# Patient Record
Sex: FEMALE | Race: WHITE | Employment: FULL TIME | URBAN - METROPOLITAN AREA
[De-identification: names, ages, dates, MRNs, and addresses within clinical notes are randomized per-mention and may not be internally consistent; named-entity substitution may affect disease eponyms.]

---

## 2017-12-15 PROBLEM — R01.1 MURMUR: Status: ACTIVE | Noted: 2017-12-15

## 2017-12-15 PROBLEM — Q23.1 BICUSPID AORTIC VALVE: Status: ACTIVE | Noted: 2017-12-15

## 2017-12-15 PROBLEM — Q23.1 BICUSPID AORTIC VALVE (HCC): Status: ACTIVE | Noted: 2017-12-15

## 2017-12-15 PROBLEM — Q23.81 BICUSPID AORTIC VALVE: Status: ACTIVE | Noted: 2017-12-15

## 2023-09-28 ENCOUNTER — TELEPHONE (OUTPATIENT)
Dept: FAMILY MEDICINE CLINIC | Facility: CLINIC | Age: 33
End: 2023-09-28

## 2023-09-28 ENCOUNTER — OFFICE VISIT (OUTPATIENT)
Dept: FAMILY MEDICINE CLINIC | Facility: CLINIC | Age: 33
End: 2023-09-28
Payer: COMMERCIAL

## 2023-09-28 ENCOUNTER — HOSPITAL ENCOUNTER (OUTPATIENT)
Dept: GENERAL RADIOLOGY | Age: 33
Discharge: HOME | End: 2023-09-28
Attending: FAMILY MEDICINE
Payer: COMMERCIAL

## 2023-09-28 VITALS
BODY MASS INDEX: 29.29 KG/M2 | DIASTOLIC BLOOD PRESSURE: 70 MMHG | SYSTOLIC BLOOD PRESSURE: 122 MMHG | WEIGHT: 175.81 LBS | TEMPERATURE: 97 F | RESPIRATION RATE: 26 BRPM | HEIGHT: 65 IN | HEART RATE: 101 BPM | OXYGEN SATURATION: 100 %

## 2023-09-28 DIAGNOSIS — Z23 NEED FOR VACCINATION: ICD-10-CM

## 2023-09-28 DIAGNOSIS — R20.0 NUMBNESS AND TINGLING: Primary | ICD-10-CM

## 2023-09-28 DIAGNOSIS — R07.89 ATYPICAL CHEST PAIN: ICD-10-CM

## 2023-09-28 DIAGNOSIS — R20.2 NUMBNESS AND TINGLING: Primary | ICD-10-CM

## 2023-09-28 LAB
ALBUMIN SERPL-MCNC: 3.8 G/DL (ref 3.4–5)
ALBUMIN/GLOB SERPL: 1 {RATIO} (ref 1–2)
ALP LIVER SERPL-CCNC: 57 U/L
ALT SERPL-CCNC: 28 U/L
ANION GAP SERPL CALC-SCNC: 5 MMOL/L (ref 0–18)
AST SERPL-CCNC: 15 U/L (ref 15–37)
BASOPHILS # BLD AUTO: 0.06 X10(3) UL (ref 0–0.2)
BASOPHILS NFR BLD AUTO: 0.9 %
BILIRUB SERPL-MCNC: 0.3 MG/DL (ref 0.1–2)
BUN BLD-MCNC: 13 MG/DL (ref 7–18)
CALCIUM BLD-MCNC: 9.2 MG/DL (ref 8.5–10.1)
CHLORIDE SERPL-SCNC: 111 MMOL/L (ref 98–112)
CO2 SERPL-SCNC: 25 MMOL/L (ref 21–32)
CREAT BLD-MCNC: 0.9 MG/DL
EGFRCR SERPLBLD CKD-EPI 2021: 85 ML/MIN/1.73M2 (ref 60–?)
EOSINOPHIL # BLD AUTO: 0.07 X10(3) UL (ref 0–0.7)
EOSINOPHIL NFR BLD AUTO: 1 %
ERYTHROCYTE [DISTWIDTH] IN BLOOD BY AUTOMATED COUNT: 12.5 %
ERYTHROCYTE [SEDIMENTATION RATE] IN BLOOD: 21 MM/HR
EST. AVERAGE GLUCOSE BLD GHB EST-MCNC: 117 MG/DL (ref 68–126)
FASTING STATUS PATIENT QL REPORTED: YES
GLOBULIN PLAS-MCNC: 3.8 G/DL (ref 2.8–4.4)
GLUCOSE BLD-MCNC: 105 MG/DL (ref 70–99)
HBA1C MFR BLD: 5.7 % (ref ?–5.7)
HCT VFR BLD AUTO: 42.7 %
HGB BLD-MCNC: 14.3 G/DL
IMM GRANULOCYTES # BLD AUTO: 0.01 X10(3) UL (ref 0–1)
IMM GRANULOCYTES NFR BLD: 0.1 %
LIPASE SERPL-CCNC: 90 U/L (ref 13–75)
LYMPHOCYTES # BLD AUTO: 1.69 X10(3) UL (ref 1–4)
LYMPHOCYTES NFR BLD AUTO: 24.4 %
MCH RBC QN AUTO: 29 PG (ref 26–34)
MCHC RBC AUTO-ENTMCNC: 33.5 G/DL (ref 31–37)
MCV RBC AUTO: 86.6 FL
MONOCYTES # BLD AUTO: 0.49 X10(3) UL (ref 0.1–1)
MONOCYTES NFR BLD AUTO: 7.1 %
NEUTROPHILS # BLD AUTO: 4.6 X10 (3) UL (ref 1.5–7.7)
NEUTROPHILS # BLD AUTO: 4.6 X10(3) UL (ref 1.5–7.7)
NEUTROPHILS NFR BLD AUTO: 66.5 %
OSMOLALITY SERPL CALC.SUM OF ELEC: 292 MOSM/KG (ref 275–295)
PLATELET # BLD AUTO: 260 10(3)UL (ref 150–450)
POTASSIUM SERPL-SCNC: 4.2 MMOL/L (ref 3.5–5.1)
PROT SERPL-MCNC: 7.6 G/DL (ref 6.4–8.2)
RBC # BLD AUTO: 4.93 X10(6)UL
SODIUM SERPL-SCNC: 141 MMOL/L (ref 136–145)
TSI SER-ACNC: 0.95 MIU/ML (ref 0.36–3.74)
VIT B12 SERPL-MCNC: 320 PG/ML (ref 193–986)
WBC # BLD AUTO: 6.9 X10(3) UL (ref 4–11)

## 2023-09-28 PROCEDURE — 90471 IMMUNIZATION ADMIN: CPT | Performed by: FAMILY MEDICINE

## 2023-09-28 PROCEDURE — 83690 ASSAY OF LIPASE: CPT | Performed by: FAMILY MEDICINE

## 2023-09-28 PROCEDURE — 99204 OFFICE O/P NEW MOD 45 MIN: CPT | Performed by: FAMILY MEDICINE

## 2023-09-28 PROCEDURE — 80050 GENERAL HEALTH PANEL: CPT | Performed by: FAMILY MEDICINE

## 2023-09-28 PROCEDURE — 83036 HEMOGLOBIN GLYCOSYLATED A1C: CPT | Performed by: FAMILY MEDICINE

## 2023-09-28 PROCEDURE — 85652 RBC SED RATE AUTOMATED: CPT | Performed by: FAMILY MEDICINE

## 2023-09-28 PROCEDURE — 3074F SYST BP LT 130 MM HG: CPT | Performed by: FAMILY MEDICINE

## 2023-09-28 PROCEDURE — 3008F BODY MASS INDEX DOCD: CPT | Performed by: FAMILY MEDICINE

## 2023-09-28 PROCEDURE — 3078F DIAST BP <80 MM HG: CPT | Performed by: FAMILY MEDICINE

## 2023-09-28 PROCEDURE — 82607 VITAMIN B-12: CPT | Performed by: FAMILY MEDICINE

## 2023-09-28 PROCEDURE — 71046 X-RAY EXAM CHEST 2 VIEWS: CPT | Performed by: FAMILY MEDICINE

## 2023-09-28 PROCEDURE — 36415 COLL VENOUS BLD VENIPUNCTURE: CPT | Performed by: FAMILY MEDICINE

## 2023-09-28 PROCEDURE — 90715 TDAP VACCINE 7 YRS/> IM: CPT | Performed by: FAMILY MEDICINE

## 2023-09-28 RX ORDER — MULTIVITAMIN
TABLET ORAL
COMMUNITY

## 2023-09-28 RX ORDER — OMEPRAZOLE 40 MG/1
CAPSULE, DELAYED RELEASE ORAL
Qty: 30 CAPSULE | Refills: 0 | Status: SHIPPED | OUTPATIENT
Start: 2023-09-28 | End: 2023-09-28

## 2023-09-28 RX ORDER — OMEPRAZOLE 40 MG/1
CAPSULE, DELAYED RELEASE ORAL
Qty: 30 CAPSULE | Refills: 0 | Status: SHIPPED | OUTPATIENT
Start: 2023-09-28

## 2023-09-28 RX ORDER — ACETAMINOPHEN 160 MG
TABLET,DISINTEGRATING ORAL
COMMUNITY

## 2023-09-28 RX ORDER — OMEPRAZOLE 40 MG/1
CAPSULE, DELAYED RELEASE ORAL
Qty: 90 CAPSULE | Refills: 0 | OUTPATIENT
Start: 2023-09-28

## 2023-09-28 RX ORDER — DEXAMETHASONE SODIUM PHOSPHATE 4 MG/ML
1 INJECTION, SOLUTION INTRAMUSCULAR; INTRAVENOUS DAILY
COMMUNITY

## 2023-09-28 NOTE — TELEPHONE ENCOUNTER
Patient signed HIPAA medical records authorization form for the Facility identified below to disclose patient health information to [facility] / Provider Name: [facility]   Facility Address: [address] [place] [address] Phone: Orville Dean Fax:  [phone]    Specific PHI to be disclosed: pap exam, vaccines,labs     Medical records timeframe: all and any     Medical Records Request/Authorization form has been faxed to above Facility/Provider. Received fax confirmation. MR Authorization form has also been sent to scanning.

## 2023-09-28 NOTE — PROGRESS NOTES
Patient came in for draw of ordered fasting labs. Patient drawn out of right AC, x 1 attempt and tolerated well. Two light greens and a lavender tube drawn.

## 2023-09-30 DIAGNOSIS — R07.89 ATYPICAL CHEST PAIN: ICD-10-CM

## 2023-10-02 RX ORDER — OMEPRAZOLE 40 MG/1
CAPSULE, DELAYED RELEASE ORAL
Qty: 90 CAPSULE | Refills: 0 | OUTPATIENT
Start: 2023-10-02

## 2023-10-04 DIAGNOSIS — E53.8 LOW SERUM VITAMIN B12: Primary | ICD-10-CM

## 2023-10-04 RX ORDER — PYRIDOXINE HCL (VITAMIN B6) 50 MG
500 TABLET ORAL DAILY
Qty: 90 TABLET | Refills: 3 | COMMUNITY
Start: 2023-10-04 | End: 2023-11-03

## 2023-11-03 ENCOUNTER — OFFICE VISIT (OUTPATIENT)
Dept: NEUROLOGY | Facility: CLINIC | Age: 33
End: 2023-11-03
Payer: COMMERCIAL

## 2023-11-03 VITALS
HEIGHT: 65 IN | SYSTOLIC BLOOD PRESSURE: 116 MMHG | RESPIRATION RATE: 16 BRPM | DIASTOLIC BLOOD PRESSURE: 78 MMHG | BODY MASS INDEX: 29.16 KG/M2 | WEIGHT: 175 LBS | HEART RATE: 100 BPM

## 2023-11-03 DIAGNOSIS — M79.2 THORACIC NEURALGIA: Primary | ICD-10-CM

## 2023-11-03 DIAGNOSIS — R29.2: ICD-10-CM

## 2023-11-03 DIAGNOSIS — R29.2 BRISK DEEP TENDON REFLEXES: ICD-10-CM

## 2023-12-08 ENCOUNTER — TELEPHONE (OUTPATIENT)
Dept: CASE MANAGEMENT | Age: 33
End: 2023-12-08

## 2023-12-08 NOTE — TELEPHONE ENCOUNTER
Dr. Dobbs        Status: DENIED        Reference number [id number]     A copy of the denial letter is filed under the MEDIA tab, reference for complete details. You may reach out to Radhika at [phone] to discuss decision.     Please reach out to patient with plan of care.      Thank you  Yulia FAIRCHILD

## 2023-12-14 ENCOUNTER — OFFICE VISIT (OUTPATIENT)
Dept: FAMILY MEDICINE CLINIC | Facility: CLINIC | Age: 33
End: 2023-12-14
Payer: COMMERCIAL

## 2023-12-14 VITALS
OXYGEN SATURATION: 98 % | DIASTOLIC BLOOD PRESSURE: 70 MMHG | HEIGHT: 65.5 IN | HEART RATE: 91 BPM | WEIGHT: 178 LBS | TEMPERATURE: 97 F | BODY MASS INDEX: 29.3 KG/M2 | RESPIRATION RATE: 20 BRPM | SYSTOLIC BLOOD PRESSURE: 122 MMHG

## 2023-12-14 DIAGNOSIS — Z13.29 SCREENING FOR ENDOCRINE, NUTRITIONAL, METABOLIC AND IMMUNITY DISORDER: ICD-10-CM

## 2023-12-14 DIAGNOSIS — R20.2 NUMBNESS AND TINGLING: ICD-10-CM

## 2023-12-14 DIAGNOSIS — Q23.1 BICUSPID AORTIC VALVE: ICD-10-CM

## 2023-12-14 DIAGNOSIS — Z86.59 HISTORY OF ANXIETY: ICD-10-CM

## 2023-12-14 DIAGNOSIS — Z13.21 SCREENING FOR ENDOCRINE, NUTRITIONAL, METABOLIC AND IMMUNITY DISORDER: ICD-10-CM

## 2023-12-14 DIAGNOSIS — R74.8 ELEVATED LIPASE: ICD-10-CM

## 2023-12-14 DIAGNOSIS — Z13.6 SCREENING FOR ISCHEMIC HEART DISEASE: ICD-10-CM

## 2023-12-14 DIAGNOSIS — I35.1 NONRHEUMATIC AORTIC VALVE INSUFFICIENCY: ICD-10-CM

## 2023-12-14 DIAGNOSIS — Z00.00 ANNUAL PHYSICAL EXAM: Primary | ICD-10-CM

## 2023-12-14 DIAGNOSIS — R20.0 NUMBNESS AND TINGLING: ICD-10-CM

## 2023-12-14 DIAGNOSIS — Z13.0 SCREENING FOR ENDOCRINE, NUTRITIONAL, METABOLIC AND IMMUNITY DISORDER: ICD-10-CM

## 2023-12-14 DIAGNOSIS — Z23 NEED FOR VACCINATION: ICD-10-CM

## 2023-12-14 DIAGNOSIS — Z13.228 SCREENING FOR ENDOCRINE, NUTRITIONAL, METABOLIC AND IMMUNITY DISORDER: ICD-10-CM

## 2023-12-14 DIAGNOSIS — E53.8 B12 DEFICIENCY: ICD-10-CM

## 2023-12-14 LAB
ALBUMIN SERPL-MCNC: 4 G/DL (ref 3.4–5)
ALBUMIN/GLOB SERPL: 1 {RATIO} (ref 1–2)
ALP LIVER SERPL-CCNC: 73 U/L
ALT SERPL-CCNC: 36 U/L
ANION GAP SERPL CALC-SCNC: 7 MMOL/L (ref 0–18)
AST SERPL-CCNC: 10 U/L (ref 15–37)
BASOPHILS # BLD AUTO: 0.05 X10(3) UL (ref 0–0.2)
BASOPHILS NFR BLD AUTO: 0.7 %
BILIRUB SERPL-MCNC: 0.3 MG/DL (ref 0.1–2)
BUN BLD-MCNC: 17 MG/DL (ref 9–23)
CALCIUM BLD-MCNC: 9.5 MG/DL (ref 8.5–10.1)
CHLORIDE SERPL-SCNC: 108 MMOL/L (ref 98–112)
CHOLEST SERPL-MCNC: 207 MG/DL (ref ?–200)
CO2 SERPL-SCNC: 25 MMOL/L (ref 21–32)
CREAT BLD-MCNC: 0.94 MG/DL
EGFRCR SERPLBLD CKD-EPI 2021: 81 ML/MIN/1.73M2 (ref 60–?)
EOSINOPHIL # BLD AUTO: 0.08 X10(3) UL (ref 0–0.7)
EOSINOPHIL NFR BLD AUTO: 1.1 %
ERYTHROCYTE [DISTWIDTH] IN BLOOD BY AUTOMATED COUNT: 13 %
FASTING PATIENT LIPID ANSWER: YES
FASTING STATUS PATIENT QL REPORTED: YES
GLOBULIN PLAS-MCNC: 4 G/DL (ref 2.8–4.4)
GLUCOSE BLD-MCNC: 99 MG/DL (ref 70–99)
HCT VFR BLD AUTO: 43.5 %
HDLC SERPL-MCNC: 81 MG/DL (ref 40–59)
HGB BLD-MCNC: 14.3 G/DL
IMM GRANULOCYTES # BLD AUTO: 0.02 X10(3) UL (ref 0–1)
IMM GRANULOCYTES NFR BLD: 0.3 %
LDLC SERPL CALC-MCNC: 115 MG/DL (ref ?–100)
LIPASE SERPL-CCNC: 77 U/L (ref ?–300)
LYMPHOCYTES # BLD AUTO: 1.78 X10(3) UL (ref 1–4)
LYMPHOCYTES NFR BLD AUTO: 24.8 %
MCH RBC QN AUTO: 28.3 PG (ref 26–34)
MCHC RBC AUTO-ENTMCNC: 32.9 G/DL (ref 31–37)
MCV RBC AUTO: 86 FL
MONOCYTES # BLD AUTO: 0.57 X10(3) UL (ref 0.1–1)
MONOCYTES NFR BLD AUTO: 7.9 %
NEUTROPHILS # BLD AUTO: 4.68 X10 (3) UL (ref 1.5–7.7)
NEUTROPHILS # BLD AUTO: 4.68 X10(3) UL (ref 1.5–7.7)
NEUTROPHILS NFR BLD AUTO: 65.2 %
NONHDLC SERPL-MCNC: 126 MG/DL (ref ?–130)
OSMOLALITY SERPL CALC.SUM OF ELEC: 292 MOSM/KG (ref 275–295)
PLATELET # BLD AUTO: 331 10(3)UL (ref 150–450)
POTASSIUM SERPL-SCNC: 4.1 MMOL/L (ref 3.5–5.1)
PROT SERPL-MCNC: 8 G/DL (ref 6.4–8.2)
RBC # BLD AUTO: 5.06 X10(6)UL
SODIUM SERPL-SCNC: 140 MMOL/L (ref 136–145)
TRIGL SERPL-MCNC: 60 MG/DL (ref 30–149)
TSI SER-ACNC: 0.96 MIU/ML (ref 0.36–3.74)
VLDLC SERPL CALC-MCNC: 10 MG/DL (ref 0–30)
WBC # BLD AUTO: 7.2 X10(3) UL (ref 4–11)

## 2023-12-14 PROCEDURE — 99395 PREV VISIT EST AGE 18-39: CPT | Performed by: FAMILY MEDICINE

## 2023-12-14 PROCEDURE — 3078F DIAST BP <80 MM HG: CPT | Performed by: FAMILY MEDICINE

## 2023-12-14 PROCEDURE — 3008F BODY MASS INDEX DOCD: CPT | Performed by: FAMILY MEDICINE

## 2023-12-14 PROCEDURE — 80061 LIPID PANEL: CPT | Performed by: FAMILY MEDICINE

## 2023-12-14 PROCEDURE — 80050 GENERAL HEALTH PANEL: CPT | Performed by: FAMILY MEDICINE

## 2023-12-14 PROCEDURE — 83690 ASSAY OF LIPASE: CPT | Performed by: FAMILY MEDICINE

## 2023-12-14 PROCEDURE — 3074F SYST BP LT 130 MM HG: CPT | Performed by: FAMILY MEDICINE

## 2023-12-14 RX ORDER — CHOLECALCIFEROL (VITAMIN D3) 125 MCG
500 CAPSULE ORAL DAILY
COMMUNITY

## 2023-12-14 NOTE — PROGRESS NOTES
Patient came in for draw of ordered fasting labs. Patient drawn out of right arm  AC, x 1 attempt and tolerated well.  1 SST ( green ) 1 Lavender  tube drawn.

## 2023-12-18 ENCOUNTER — LAB ENCOUNTER (OUTPATIENT)
Dept: LAB | Age: 33
End: 2023-12-18
Attending: FAMILY MEDICINE
Payer: COMMERCIAL

## 2023-12-18 DIAGNOSIS — E53.8 B12 DEFICIENCY: ICD-10-CM

## 2023-12-18 DIAGNOSIS — R20.0 NUMBNESS AND TINGLING: ICD-10-CM

## 2023-12-18 DIAGNOSIS — R20.2 NUMBNESS AND TINGLING: ICD-10-CM

## 2023-12-18 LAB — VIT B12 SERPL-MCNC: 478 PG/ML (ref 193–986)

## 2023-12-18 PROCEDURE — 36415 COLL VENOUS BLD VENIPUNCTURE: CPT

## 2023-12-18 PROCEDURE — 82607 VITAMIN B-12: CPT

## 2024-02-12 ENCOUNTER — PATIENT MESSAGE (OUTPATIENT)
Dept: FAMILY MEDICINE CLINIC | Facility: CLINIC | Age: 34
End: 2024-02-12

## 2024-02-12 NOTE — TELEPHONE ENCOUNTER
Called and discussed via [id number]    Imaging approved for both MRI cervical / thoracic spine with and without contrast - evaluating for myelopathy; rule out demyelinating disease    Auth# [id number]  Valid 2/12 -4/11/2024

## 2024-02-14 NOTE — TELEPHONE ENCOUNTER
From: Daina Neves  To: Krystle Ramires  Sent: 2/12/2024 12:45 PM CST  Subject: RE: Upcoming Appointment    Good Afternoon -    I have a follow-up appointment on 3/8.    I was finally able to get my MRI insurance situation sorted out and now have my MRI scheduled for 3/15. Would it be better to wait until after my MRI is complete to come in, or should I still keep the 3/8 appointment?    Thanks,  Daina Neves  
Kathernie Red)

## 2024-03-15 ENCOUNTER — HOSPITAL ENCOUNTER (OUTPATIENT)
Dept: MRI IMAGING | Age: 34
Discharge: HOME | End: 2024-03-15
Attending: Other
Payer: COMMERCIAL

## 2024-03-15 DIAGNOSIS — R29.2 BRISK DEEP TENDON REFLEXES: ICD-10-CM

## 2024-03-15 DIAGNOSIS — M79.2 THORACIC NEURALGIA: ICD-10-CM

## 2024-03-15 DIAGNOSIS — R29.2: ICD-10-CM

## 2024-03-15 PROCEDURE — 72157 MRI CHEST SPINE W/O & W/DYE: CPT | Performed by: OTHER

## 2024-03-15 PROCEDURE — A9575 INJ GADOTERATE MEGLUMI 0.1ML: HCPCS | Performed by: OTHER

## 2024-03-15 PROCEDURE — 72156 MRI NECK SPINE W/O & W/DYE: CPT | Performed by: OTHER

## 2024-03-15 RX ORDER — GADOTERATE MEGLUMINE 376.9 MG/ML
20 INJECTION INTRAVENOUS
Status: COMPLETED | OUTPATIENT
Start: 2024-03-15 | End: 2024-03-15

## 2024-03-15 RX ADMIN — GADOTERATE MEGLUMINE 17 ML: 376.9 INJECTION INTRAVENOUS at 10:19:00

## 2024-03-19 RX ORDER — OMEPRAZOLE 20 MG/1
CAPSULE, DELAYED RELEASE ORAL
COMMUNITY
End: 2024-03-21

## 2024-03-21 ENCOUNTER — OFFICE VISIT (OUTPATIENT)
Dept: FAMILY MEDICINE CLINIC | Facility: CLINIC | Age: 34
End: 2024-03-21
Payer: COMMERCIAL

## 2024-03-21 VITALS
RESPIRATION RATE: 16 BRPM | TEMPERATURE: 98 F | OXYGEN SATURATION: 98 % | DIASTOLIC BLOOD PRESSURE: 72 MMHG | HEART RATE: 91 BPM | BODY MASS INDEX: 30 KG/M2 | WEIGHT: 180.63 LBS | SYSTOLIC BLOOD PRESSURE: 120 MMHG

## 2024-03-21 DIAGNOSIS — R20.2 NUMBNESS AND TINGLING: Primary | ICD-10-CM

## 2024-03-21 DIAGNOSIS — M51.24 HERNIATION OF INTERVERTEBRAL DISC BETWEEN T11 AND T12: ICD-10-CM

## 2024-03-21 DIAGNOSIS — R20.0 NUMBNESS AND TINGLING: Primary | ICD-10-CM

## 2024-03-21 PROCEDURE — 99214 OFFICE O/P EST MOD 30 MIN: CPT | Performed by: FAMILY MEDICINE

## 2024-03-21 PROCEDURE — 3078F DIAST BP <80 MM HG: CPT | Performed by: FAMILY MEDICINE

## 2024-03-21 PROCEDURE — 3074F SYST BP LT 130 MM HG: CPT | Performed by: FAMILY MEDICINE

## 2024-03-21 NOTE — PROGRESS NOTES
CHIEF COMPLAINT:   Chief Complaint   Patient presents with    Follow - Up     MRI test results      HPI:     Daina Neves is a 33 year old female presents for discuss MRI results. Hx of numnbness on thr right lateral chest wall > 18 months.  Continues with right-sided anterior lower chest wall numbness. Worse if sitting and leans forward.  Is able to workout 5 days a week and lift weights without any difficulty.  Denies any breathing issues.  Denies any chest pain.  No cough or fevers.  Recently completed MRI of the cervical and thoracic spine ordered by neurologist wants to review results.  Has had a negative chest x-ray previously.  Had an ultrasound of the abdomen was essentially normal.  Parts of the pancreas were not visualized and small area of the liver was not visualized but otherwise everything appeared normal.  Liver was homogenous.  Has no abdominal pain.  Feels well otherwise.  Denies any other numbness or tingling in the arms or legs. Denies back pain.       Partial HPI   Complains of numbness on the right lateral chest wall just below her bra strap greater than 1 year.  She states it started as a 3 to 4 cm area and feels like over the past couple months it is now spreading to the sides of her chest wall laterally.  She has no chronic back pain or back issues.  Had an episode of back pain a few months ago that resolved on its own.  She has no chest pain, shortness of breath, acid reflux, dysphagia, unexplained weight loss, fevers, night sweats, tingling in her arms or legs, urinary retention, double vision, dizziness, cough, rashes, history of shingles.  Notices occasionally of bends at 90 degrees and leans forward can have pain near that area.  Has no other chest discomfort is only associated with mechanical bending.  Completed neurology with Dr. Dobbs and insurance has denied MRI of cervical and thoracic spine since not having numbness and tingling in the arms and legs.  Patient is worried if  could have an abdominal mass in the area causing the symptoms.     Has history of bicuspid valve with cardiac murmur and has seen a cardiologist, Dr. Ron Ross, regularly.  Last evaluation he informed her that her echo was stable and she did not need to return for at least 3 years.      HISTORY:  Past Medical History:   Diagnosis Date    Allergic rhinitis 2017    Anxiety 2010    Bicuspid aortic valve (HCC)     Hyperlipidemia 2019    Nonrheumatic aortic (valve) insufficiency       History reviewed. No pertinent surgical history.   Family History   Problem Relation Age of Onset    Diabetes Mother     Obesity Mother     Other (blood clots) Mother     Asthma Mother     Cancer Paternal Grandfather         lung CA      Social History:   Social History     Socioeconomic History    Marital status: Single   Tobacco Use    Smoking status: Never    Smokeless tobacco: Never   Substance and Sexual Activity    Alcohol use: Yes     Alcohol/week: 3.0 standard drinks of alcohol     Types: 1 Glasses of wine, 2 Standard drinks or equivalent per week     Comment: socially    Drug use: No   Other Topics Concern    Caffeine Concern No    Exercise Yes    Seat Belt Yes    Special Diet No    Stress Concern No    Weight Concern No   Social History Narrative    Single     No kids    Nonsmoker    Occasional EtOH    No drugs            Medications (Active prior to today's visit):  Current Outpatient Medications   Medication Sig Dispense Refill    cyanocobalamin 500 MCG Oral Tab Take 1 tablet (500 mcg total) by mouth daily.      valACYclovir 50 mg/mL Oral Suspension Take 20 mL (1,000 mg total) by mouth every 8 (eight) hours.      Multiple Vitamin (MULTI-DAY VITAMINS) Oral Tab Take by mouth.      calcium carb-cholecalciferol 500-10 MG-MCG Oral Chew Tab Chew 1 tablet by mouth daily.      cholecalciferol 50 MCG (2000 UT) Oral Cap Take by mouth.      MedroxyPROGESTERone Acetate (DEPO-PROVERA) 150 MG/ML Intramuscular Suspension Last  given 12/12/2023         Allergies:  Allergies   Allergen Reactions    Avocado SHORTNESS OF BREATH and OTHER (SEE COMMENTS)     SOB        PSFH elements reviewed from today and agreed except as otherwise stated in HPI.  PHYSICAL EXAM:   /72 (BP Location: Left arm, Patient Position: Sitting, Cuff Size: adult)   Pulse 91   Temp 98.4 °F (36.9 °C) (Temporal)   Resp 16   Wt 180 lb 9.6 oz (81.9 kg)   LMP  (LMP Unknown)   SpO2 98%   BMI 29.60 kg/m²   Vital signs reviewed.Appears stated age, well groomed.  Physical Exam  Chest:          Comments: red dots marked the area of numbness there is slight indentation in the cartilage which is just an anatomical variant.  There is no reproducible pain.  But this is the area where she notices the numbness-the anterior cartilaginous portion on the right side of the ribs.       General: Well-nourished, well hydrated. No acute distress. No pallor. No tachypnea. Non toxic.  HEENT: normocephaly, atraumatic.  Sclera clear and non icteric bilaterally.  Oral pharynx clear without normal finding.  Moist mucous membranes.  Neck: supple. No adenopathy. No thyromegaly.   Heart: RRR without S3 or S4 murmur . Clear S1S2  Lungs: clear to auscultation bilaterally. No rales, rhonchi or wheezes. Good inspiratory and expiratory effort  Abdomen: soft, nontender, nondistended. NL bowel sounds. No HSM  Extremities: No cyanosis, clubbing, or edema bilaterally.   Skin: no acute rashes  Neuro: AOx3.  Normal gait     LABS   tudy Result    Narrative   PROCEDURE:  MRI CERVICAL+THORACIC SPINE (ALL W+WO) (CPT=72156/64058)     COMPARISON:  None.     INDICATIONS:  M79.2 Thoracic neuralgia R29.2 Avalos's reflex positive R29.2 Brisk deep tendon reflexes     TECHNIQUE:  A variety of imaging planes and parameters were utilized for visualization of suspected pathology prior to and after intravenous gadolinium injection.       PATIENT STATED HISTORY:(As transcribed by Technologist)  Patient has numbness in  the sternum with right side abdominal pain.      CONTRAST USED:  17 mL of Dotarem     FINDINGS:       Partial fusion of C2-C3.  Vertebral body heights and disc spaces are otherwise well-maintained.     Prevertebral soft tissues and predental space are unremarkable.     Alignment is overall unremarkable.           CERVICAL DISC LEVELS:  C2-C3:  No significant disc/facet abnormality, spinal stenosis, or foraminal narrowing.  C3-C4:  No significant disc/facet abnormality, spinal stenosis, or foraminal narrowing.  C4-C5:  No significant disc/facet abnormality, spinal stenosis, or foraminal narrowing.  C5-C6:  No significant disc/facet abnormality, spinal stenosis, or foraminal narrowing.  C6-C7:  No significant disc/facet abnormality, spinal stenosis, or foraminal narrowing.  C7-T1:  No significant disc/facet abnormality, spinal stenosis, or foraminal narrowing.     CRANIOCERVICAL AREA:  Normal foramen magnum with no Chiari malformation.    PARASPINAL AREA:  Normal with no visible mass.    RACHEL STRUCTURES:  No fracture, pars defect, or osseous lesion.    CORD:  Normal caliber, contour and signal intensity.       THORACIC DISC LEVELS:  CORD:  Normal caliber, contour, and signal.  The conus terminates at a normal level.  No abnormal contrast enhancement.    BONES:  Normal alignment without significant spondylosis or scoliosis.    DISCS:  Left paracentral disc bulge/herniation at T11-T12.  This minimally effacing the thoracic cord.  No significant spinal canal or neural foraminal stenosis.  OTHER:  Unremarkable paraspinous region with no visible mass.                     Impression   CONCLUSION:       1. No significant spinal canal or neural foraminal stenosis in the cervical spine.     2. A left paracentral disc herniation T11-T12 is minimally effacing the thoracic cord.  There is no significant spinal canal or neural foraminal stenosis.     3. No enhancing lesions.       No visits with results within 2 Month(s) from  this visit.   Latest known visit with results is:   [facility] Lab Encounter on 12/18/2023   Component Date Value    Vitamin B12 12/18/2023 478     xam: US ABDOMEN COMPLETE   CPT Code(s): 62856 - US EXAM ABDOM COMPLETE     EXAM: Complete abdominal ultrasound 1/8/2024.     COMPARISON:  None.     INDICATION: Numbness and tingling. Right upper quadrant numbness. Elevated lipase.     TECHNIQUE:  Grayscale and color Doppler ultrasound images of the abdomen were obtained.     FINDINGS:  The pancreas is partially obscured by overlying bowel gas. No appreciable abnormality of the visualized portion of the head to proximal body of the pancreas.     The liver demonstrates normal size and echogenicity. No focal hepatic lesions or intrahepatic biliary ductal dilatation are identified, although shadowing from adjacent ribs limits evaluation. The main portal vein demonstrates hepatopetal color flow. The    common duct measures 4 mm at the dana hepatis.     The gallbladder appears within normal limits, without shadowing echogenic gallstones, wall thickening, or pericholecystic fluid. No ascites is seen on the images provided.     The proximal abdominal aorta is obscured by overlying bowel gas and suboptimally assessed. The mid to distal abdominal aorta is patent and nonaneurysmal, measuring 1.5 and 1.3 cm in AP diameter, respectively. The intrahepatic IVC is patent.     The right kidney measures 9.9 cm in length. The left kidney measures approximately 10.5 cm in length, with the lower pole partially obscured by overlying bowel gas. No hydronephrosis. No renal mass, shadowing renal calculus, or perinephric fluid   collections are identified on the images provided. Normal color flow in the renal ani.     The spleen is normal in size, measuring 8.7 cm in length.     IMPRESSION:   1. Suboptimal evaluation of portions of the pancreas, the proximal abdominal aorta, and the lower pole of the left kidney due to overlying bowel gas. Suboptimal  evaluation of portions of the liver due to shadowing from adjacent ribs.   2. Otherwise, no appreciable abnormality of the abdomen, within the limitations of this exam.     This report was performed utilizing speech recognition software technology. Despite thorough proofreading, speech recognition errors could escape detection. If a word or phrase is confusing or out of context, please do not hesitate to call for   clarification.     Interpreting Radiologist:     Merle Alcazar M.D.   Electronically Signed: 01/08/2024 09:41 AM   REVIEWED THIS VISIT  ASSESSMENT/PLAN:   33 year old female with    1. Numbness and tingling  2. Herniation of intervertebral disc between T11 and T12  Etiology still unclear  Slight anatomical cavitation in cartilaginous portion of right rib causing some impingement on the nerve?  MRI does not demonstrate any demyelinating disease or lesion.  Discussed with patient this is very reassuring.  Has no pain with disc herniation is mild is paracentral there is no stenosis.  Is on the left side do not feel this could correlate to her symptoms.  Will defer for confirmation to neurologist she will schedule follow-up with Dr. Dobbs.  Patient's had a negative abdominal ultrasound of liver pancreas was not visualized but no other pancreatic symptoms or warning symptoms of cancer, no i.e. unexplained weight loss loss of appetite night sweats, vomiting, abdominal pain excetra.  Chest x-ray was completely clear.  If symptoms worsen or change could consider MRI of the chest to look at the more soft tissue versus CT.  Thus far after 2 years symptoms have not progressed but have not improved.  May be structural injury to a nerve.  Will await consult with neurologist for final recommendations    Meds This Visit:  Requested Prescriptions      No prescriptions requested or ordered in this encounter     Time spent 20 minutes with patient.  3 minutes reviewing chart and consult notes and 10 minutes writing note for  total of 33 minutes    Health Maintenance:  Health Maintenance   Topic Date Due    Influenza Vaccine (1) 06/30/2024 (Originally 10/1/2023)    Pneumococcal Vaccine: Birth to 64yrs (1 of 2 - PCV) 12/14/2024 (Originally [birth year]1994)    COVID-19 Vaccine (3 - 2023-24 season) 12/14/2024 (Originally 9/1/2023)    Pap Smear  09/27/2024    Annual Physical  12/14/2024    DTaP,Tdap,and Td Vaccines (2 - Td or Tdap) 09/28/2033    Annual Depression Screening  Completed         Patient/Caregiver Education: Patient/Caregiver Education: There are no barriers to learning. Medical education done.   Outcome: Patient verbalizes understanding. Patient is notified to call with any questions, comp lications, allergies, or worsening or changing symptoms.  Patient is to call with any side effects or complications from the treatments as a result of today.     Problem List:     Patient Active Problem List   Diagnosis    Bicuspid aortic valve (HCC)    Murmur    Numbness and tingling    Atypical chest pain    B12 deficiency    Nonrheumatic aortic valve insufficiency    BMI 29.0-29.9,adult    History of anxiety       Imaging & Referrals:  None     3/21/2024  Krystle Ramires, DO      Patient understands plan and follow-up.  Return in about 9 months (around 12/21/2024) for annual physical, fasting labs AM, sooner if needed..

## 2024-04-11 ENCOUNTER — OFFICE VISIT (OUTPATIENT)
Dept: NEUROLOGY | Facility: CLINIC | Age: 34
End: 2024-04-11
Payer: COMMERCIAL

## 2024-04-11 VITALS
RESPIRATION RATE: 18 BRPM | DIASTOLIC BLOOD PRESSURE: 70 MMHG | HEART RATE: 99 BPM | WEIGHT: 175 LBS | HEIGHT: 65.5 IN | BODY MASS INDEX: 28.81 KG/M2 | SYSTOLIC BLOOD PRESSURE: 130 MMHG | OXYGEN SATURATION: 98 %

## 2024-04-11 DIAGNOSIS — M79.2 THORACIC NEURALGIA: Primary | ICD-10-CM

## 2024-04-11 DIAGNOSIS — E53.8 LOW SERUM VITAMIN B12: ICD-10-CM

## 2024-04-11 PROCEDURE — 3075F SYST BP GE 130 - 139MM HG: CPT | Performed by: OTHER

## 2024-04-11 PROCEDURE — 3078F DIAST BP <80 MM HG: CPT | Performed by: OTHER

## 2024-04-11 PROCEDURE — 99214 OFFICE O/P EST MOD 30 MIN: CPT | Performed by: OTHER

## 2024-04-11 PROCEDURE — 3008F BODY MASS INDEX DOCD: CPT | Performed by: OTHER

## 2024-04-11 NOTE — PROGRESS NOTES
[facility] Progress Note    HPI  Chief Complaint   Patient presents with    Neurologic Problem     Follow-Up from my MRI       As per my initial H&P from 11/3/2023,   \" Daina Neves is a 33 year old, who presents for evaluation of numbness in R sided of the chest.  Patient states starting around June 2022, she noted an area of numbness/paresthesias over the R chest under her R breast.  She initially was seen in ER and told her bra was too tight.  She also was seen by her OB/GYN who agreed this could be the case. However, she change her type of bra and this has persisted as a numb sensation under R side of the breast but not crossing the midline; no associated rash and denies progression to other parts of her body. She additionally denies vision changes, double vision, bowel / bladder changes or incontinence or falls or balance issues.  She denies numbness in feet, face, or other parts of the body.  She additionally denies any weakness or prior transient neurologic symptoms. She is able to point to the location of this numbness and denies it wrapping around.      She has seen cardiology as well as PCP and had normal chest X-ray; she has not been taking any neuropathic pain medications and denies pain in this region but does have some \"pinching sensation\" when she bends forward on occasion; no history of neck injury, or concussion.        Otherwise, patient denies any recent weight change, fevers, chills, nausea, double vision/ blurry vision / loss of vision, chest pain, palpitations, shortness of breath, rashes, joint pains, bowel / bladder incontinence or mood issues. \"         Patient since last visit has been doing well overall.  She denies any new focal numbness/tingling/weakness.  In addition, she denies any balance issues, falls, or \"tight\" sensation around her torso, or bowel/bladder incontinence or changes.  She also denies any double vision, loss of vision, pain with moving her eyes  side-to-side, or vision changes.  She mainly notes continued sensation of intermittent numbness on the right side of her chest under her breast. She has been taking B12 supplement and has been doing well overall. She had MRI of cervical and thoracic spine and is here to discuss results.      Past Medical History:    Allergic rhinitis    Anxiety    Bicuspid aortic valve (HCC)    Hyperlipidemia    Nonrheumatic aortic (valve) insufficiency     History reviewed. No pertinent surgical history.  Family History   Problem Relation Age of Onset    Diabetes Mother     Obesity Mother     Other (blood clots) Mother     Asthma Mother     Cancer Paternal Grandfather         lung CA     Social History     Socioeconomic History    Marital status: Single   Tobacco Use    Smoking status: Never    Smokeless tobacco: Never   Substance and Sexual Activity    Alcohol use: Yes     Alcohol/week: 3.0 standard drinks of alcohol     Types: 1 Glasses of wine, 2 Standard drinks or equivalent per week     Comment: socially    Drug use: No   Other Topics Concern    Caffeine Concern No    Exercise Yes    Seat Belt Yes    Special Diet No    Stress Concern No    Weight Concern No       Allergies   Allergen Reactions    Avocado SHORTNESS OF BREATH and OTHER (SEE COMMENTS)     SOB          Current Outpatient Medications:     cyanocobalamin 500 MCG Oral Tab, Take 1 tablet (500 mcg total) by mouth daily., Disp: , Rfl:     valACYclovir 50 mg/mL Oral Suspension, Take 20 mL (1,000 mg total) by mouth every 8 (eight) hours., Disp: , Rfl:     Multiple Vitamin (MULTI-DAY VITAMINS) Oral Tab, Take by mouth., Disp: , Rfl:     calcium carb-cholecalciferol 500-10 MG-MCG Oral Chew Tab, Chew 1 tablet by mouth daily., Disp: , Rfl:     cholecalciferol 50 MCG (2000 UT) Oral Cap, Take by mouth., Disp: , Rfl:     MedroxyPROGESTERone Acetate (DEPO-PROVERA) 150 MG/ML Intramuscular Suspension, Last given 12/12/2023, Disp: , Rfl:     Review of Systems:  No chest pain or  palpitations; otherwise as noted in HPI.    Exam:  /70   Pulse 99   Resp 18   Ht 65.5\"   Wt 175 lb (79.4 kg)   LMP  (LMP Unknown)   SpO2 98%   BMI 28.68 kg/m²   Estimated body mass index is 28.68 kg/m² as calculated from the following:    Height as of this encounter: 65.5\".    Weight as of this encounter: 175 lb (79.4 kg).    Gen: well developed, well nourished, no acute distress  HEENT: normocephalic  Heart; normal S1/S2, regular rate and rhythm  Lungs: clear to auscultation bilaterally  Extremities: no edema, peripheral pulses intact    Neck: supple, full range of motion; no carotid bruits    Fundoscopic Exam: optic discs sharp bilaterally    Neuro:  Mental status:  Orientation: Alert and oriented to person, place, time  Speech Fluent and conversational    CN: PERRL, EOMI with no nystagmus, VFF, smile symmetric, sensation intact, tongue and palate midline, SCM intact, otherwise, CN 2-12 intact  Motor: 5/5 strength throughout, tone normal  DTR: 2+ brisk but symmetric throughout, toes downgoing bilaterally, no clonus  Sensory: intact to light touch throughout  Coord: FNF intact with no tremor or dysmetria; rapid alternating movements intact bilaterally  Romberg: absent  Gait: normal casual, heel, toe and tandem gait    Labs:  None new    Prior as noted below  Component      Latest Ref Rng 9/28/2023   HEMOGLOBIN A1c      <5.7 % 5.7 (H)    ESTIMATED AVERAGE GLUCOSE      68 - 126 mg/dL 117    TSH      0.358 - 3.740 mIU/mL 0.946    Vitamin B12      193 - 986 pg/mL 320    SED RATE      0 - 20 mm/Hr 21 (H)    Lipase      13 - 75 U/L 90 (H)        Imaging:  New    MRI CERVICAL+THORACIC SPINE (ALL W+WO) (CPT=72156/35594)    Result Date: 3/15/2024  CONCLUSION:   1. No significant spinal canal or neural foraminal stenosis in the cervical spine.  2. A left paracentral disc herniation T11-T12 is minimally effacing the thoracic cord.  There is no significant spinal canal or neural foraminal stenosis.  3. No  enhancing lesions.            Independently reviewed; no significant cervical or thoracic spinal canal stenosis and no demyelinating lesions or intramedullary signal change, T11/12 central disc protrusion to left side abutting but compressing spinal cord; agree with report as above otherwise         Impression/ Plan:  Daina Neves is a 34 year old female with PMHx significant for anxiety and bicuspid aortic valve, who originally presented 11/3/2023, for evaluation of numbness over the chest below the nipple line.     Neurologic exam initially demonstrated isolated decreased sensation over the R torso anteriorly mainly below the nipple line ~T4/5 level but in a localized area rather than band like distribution with brisk deep tendon reflexes and +pitts's sign, less apparent today - ? If related to low B12 as this was previously low - recommend remain on B12 supplement for now .      Given localized discomfort persistent despite modification of her bra strap which could cause localized nerve compression - MRI C/T spine was recommended and completed to evaluate for possible myelopathy.  This was done and showed no significant stenosis or evidence for demyelinating disease. There was some mild disc protrusion at T11/12, but this is below the region of her symptoms and likely incidental. In addition, her symptoms have improved and she denies pain with symptoms and does not desire neuropathic pain medication; given improvement, recommend watchful waiting over time; discussed warning signs for cord compression and/or neuropathy and advised to call office sooner with new or worsening symptoms.     1. Thoracic neuralgia  As noted above     2. Low serum vitamin B12  As noted above     30 total minutes spent, including chart review, discussion of pertinent labs and imaging with patient with plan of care / discussion as noted above; patient allowed to ask questions and all questions answered to the best of my ability      Return in about 6 months (around 10/11/2024).    Hernesto Dobbs MD, Neurology  [facility]  Pager [phone]  4/11/2024

## 2024-04-11 NOTE — PATIENT INSTRUCTIONS
Refill policies:    Allow 2-3 business days for refills; controlled substances may take longer.  Contact your pharmacy at least 5 days prior to running out of medication and have them send an electronic request or submit request through the “request refill” option in your [employer] account.  Refills are not addressed on weekends; covering physicians do not authorize routine medications on weekends.  No narcotics or controlled substances are refilled after noon on Fridays or by on call physicians.  By law, narcotics must be electronically prescribed.  A 30 day supply with no refills is the maximum allowed.  If your prescription is due for a refill, you may be due for a follow up appointment.  To best provide you care, patients receiving routine medications need to be seen at least once a year.  Patients receiving narcotic/controlled substance medications need to be seen at least once every 3 months.  In the event that your preferred pharmacy does not have the requested medication in stock (e.g. Backordered), it is your responsibility to find another pharmacy that has the requested medication available.  We will gladly send a new prescription to that pharmacy at your request.    Scheduling Tests:    If your physician has ordered radiology tests such as MRI or CT scans, please contact Central Scheduling at [phone] right away to schedule the test.  Once scheduled, the [facility] Centralized Referral Team will work with your insurance carrier to obtain pre-certification or prior authorization.  Depending on your insurance carrier, approval may take 3-10 days.  It is highly recommended patients assure they have received an authorization before having a test performed.  If test is done without insurance authorization, patient may be responsible for the entire amount billed.      Precertification and Prior Authorizations:  If your physician has recommended that you have a procedure or additional testing performed the [facility]  Centralized Referral Team will contact your insurance carrier to obtain pre-certification or prior authorization.    You are strongly encouraged to contact your insurance carrier to verify that your procedure/test has been approved and is a COVERED benefit.  Although the [facility] Centralized Referral Team does its due diligence, the insurance carrier gives the disclaimer that \"Although the procedure is authorized, this does not guarantee payment.\"    Ultimately the patient is responsible for payment.   Thank you for your understanding in this matter.  Paperwork Completion:  If you require FMLA or disability paperwork for your recovery, please make sure to either drop it off or have it faxed to our office at [phone]. Be sure the form has your name and date of birth on it.  The form will be faxed to our Forms Department and they will complete it for you.  There is a 25$ fee for all forms that need to be filled out.  Please be aware there is a 10-14 day turnaround time.  You will need to sign a release of information (DEXTER) form if your paperwork does not come with one.  You may call the Forms Department with any questions at [phone].  Their fax number is [phone].

## 2024-10-25 ENCOUNTER — OFFICE VISIT (OUTPATIENT)
Dept: NEUROLOGY | Facility: CLINIC | Age: 34
End: 2024-10-25
Payer: COMMERCIAL

## 2024-10-25 VITALS
HEART RATE: 91 BPM | SYSTOLIC BLOOD PRESSURE: 120 MMHG | RESPIRATION RATE: 16 BRPM | HEIGHT: 65.5 IN | BODY MASS INDEX: 28.81 KG/M2 | DIASTOLIC BLOOD PRESSURE: 70 MMHG | WEIGHT: 175 LBS

## 2024-10-25 DIAGNOSIS — E53.8 LOW SERUM VITAMIN B12: ICD-10-CM

## 2024-10-25 DIAGNOSIS — M79.2 THORACIC NEURALGIA: Primary | ICD-10-CM

## 2024-10-25 PROCEDURE — 99213 OFFICE O/P EST LOW 20 MIN: CPT | Performed by: OTHER

## 2024-10-25 PROCEDURE — 3074F SYST BP LT 130 MM HG: CPT | Performed by: OTHER

## 2024-10-25 PROCEDURE — 3008F BODY MASS INDEX DOCD: CPT | Performed by: OTHER

## 2024-10-25 PROCEDURE — 3078F DIAST BP <80 MM HG: CPT | Performed by: OTHER

## 2024-10-25 NOTE — PROGRESS NOTES
[facility] Progress Note    HPI  Chief Complaint   Patient presents with    Neurologic Problem     6 months Thoracic neuralgia and about the same       As per my initial H&P from 11/3/2023,   \" Daina Neves is a 33 year old, who presents for evaluation of numbness in R sided of the chest.  Patient states starting around June 2022, she noted an area of numbness/paresthesias over the R chest under her R breast.  She initially was seen in ER and told her bra was too tight.  She also was seen by her OB/GYN who agreed this could be the case. However, she change her type of bra and this has persisted as a numb sensation under R side of the breast but not crossing the midline; no associated rash and denies progression to other parts of her body. She additionally denies vision changes, double vision, bowel / bladder changes or incontinence or falls or balance issues.  She denies numbness in feet, face, or other parts of the body.  She additionally denies any weakness or prior transient neurologic symptoms. She is able to point to the location of this numbness and denies it wrapping around.      She has seen cardiology as well as PCP and had normal chest X-ray; she has not been taking any neuropathic pain medications and denies pain in this region but does have some \"pinching sensation\" when she bends forward on occasion; no history of neck injury, or concussion.        Otherwise, patient denies any recent weight change, fevers, chills, nausea, double vision/ blurry vision / loss of vision, chest pain, palpitations, shortness of breath, rashes, joint pains, bowel / bladder incontinence or mood issues. \"         Prior notes as per 4/11/2024.  Patient since last visit has been doing well overall.  She denies any new focal numbness/tingling/weakness.  In addition, she denies any balance issues, falls, or \"tight\" sensation around her torso, or bowel/bladder incontinence or changes.  She also denies any  double vision, loss of vision, pain with moving her eyes side-to-side, or vision changes.  She mainly notes continued sensation of intermittent numbness on the right side of her chest under her breast. She has been taking B12 supplement and has been doing well overall. She had MRI of cervical and thoracic spine and is here to discuss results.        Patient last seen 4/11/2024. Overall, since last visit, she has been doing well overall.  She denies any new focal numbness/tingling/weakness.  In addition, she denies any balance issues, falls, or \"tight\" sensation around her torso, or bowel/bladder incontinence or changes.  She also denies any double vision, loss of vision, pain with moving her eyes side-to-side, or vision changes.  She mainly notes continued sensation of intermittent numbness on the right side of her chest under her breast. She has been taking B12 supplement and has been doing well overall.          Past Medical History:    Allergic rhinitis    Anxiety    Bicuspid aortic valve    Hyperlipidemia    Nonrheumatic aortic (valve) insufficiency     History reviewed. No pertinent surgical history.  Family History   Problem Relation Age of Onset    Diabetes Mother     Obesity Mother     Other (blood clots) Mother     Asthma Mother     Cancer Paternal Grandfather         lung CA     Social History     Socioeconomic History    Marital status: Single   Tobacco Use    Smoking status: Never     Passive exposure: Never    Smokeless tobacco: Never   Vaping Use    Vaping status: Never Used   Substance and Sexual Activity    Alcohol use: Yes     Alcohol/week: 3.0 standard drinks of alcohol     Types: 1 Glasses of wine, 2 Standard drinks or equivalent per week     Comment: socially    Drug use: No   Other Topics Concern    Caffeine Concern Yes     Comment: 1 cup a day    Exercise Yes     Comment: 4-5 x week    Seat Belt Yes    Special Diet No    Stress Concern No    Weight Concern No       Allergies   Allergen  Reactions    Avocado SHORTNESS OF BREATH and OTHER (SEE COMMENTS)     SOB          Current Outpatient Medications:     cyanocobalamin 500 MCG Oral Tab, Take 1 tablet (500 mcg total) by mouth daily., Disp: , Rfl:     valACYclovir 50 mg/mL Oral Suspension, Take 20 mL (1,000 mg total) by mouth every 8 (eight) hours., Disp: , Rfl:     Multiple Vitamin (MULTI-DAY VITAMINS) Oral Tab, Take by mouth., Disp: , Rfl:     calcium carb-cholecalciferol 500-10 MG-MCG Oral Chew Tab, Chew 1 tablet by mouth daily., Disp: , Rfl:     cholecalciferol 50 MCG (2000 UT) Oral Cap, Take by mouth., Disp: , Rfl:     MedroxyPROGESTERone Acetate (DEPO-PROVERA) 150 MG/ML Intramuscular Suspension, Last given 12/12/2023, Disp: , Rfl:     Review of Systems:  No chest pain or palpitations; otherwise as noted in HPI.    Exam:  /70 (BP Location: Left arm, Patient Position: Sitting, Cuff Size: adult)   Pulse 91   Resp 16   Ht 65.5\"   Wt 175 lb (79.4 kg)   LMP  (LMP Unknown)   BMI 28.68 kg/m²   Estimated body mass index is 28.68 kg/m² as calculated from the following:    Height as of this encounter: 65.5\".    Weight as of this encounter: 175 lb (79.4 kg).    Gen: well developed, well nourished, no acute distress  HEENT: normocephalic  Heart; normal S1/S2, regular rate and rhythm  Lungs: clear to auscultation bilaterally  Extremities: no edema, peripheral pulses intact    Neck: supple, full range of motion; no carotid bruits    Fundoscopic Exam: optic discs sharp bilaterally    Neuro:  Mental status:  Orientation: Alert and oriented to person, place, time  Speech Fluent and conversational    CN: PERRL, EOMI with no nystagmus, VFF, smile symmetric, sensation intact, tongue and palate midline, SCM intact, otherwise, CN 2-12 intact  Motor: 5/5 strength throughout, tone normal  DTR: 2+ brisk but symmetric throughout, toes downgoing bilaterally, no clonus  Sensory: intact to light touch throughout  Coord: FNF intact with no tremor or dysmetria;  rapid alternating movements intact bilaterally  Romberg: absent  Gait: normal casual, heel, toe and tandem gait    Labs:  None new    Prior as noted below  Component      Latest Ref Rng 9/28/2023   HEMOGLOBIN A1c      <5.7 % 5.7 (H)    ESTIMATED AVERAGE GLUCOSE      68 - 126 mg/dL 117    TSH      0.358 - 3.740 mIU/mL 0.946    Vitamin B12      193 - 986 pg/mL 320    SED RATE      0 - 20 mm/Hr 21 (H)    Lipase      13 - 75 U/L 90 (H)        Imaging:  None new    Prior as noted below  MRI CERVICAL+THORACIC SPINE (ALL W+WO) (CPT=72156/16213)    Result Date: 3/15/2024  CONCLUSION:   1. No significant spinal canal or neural foraminal stenosis in the cervical spine.  2. A left paracentral disc herniation T11-T12 is minimally effacing the thoracic cord.  There is no significant spinal canal or neural foraminal stenosis.  3. No enhancing lesions.            Independently reviewed; no significant cervical or thoracic spinal canal stenosis and no demyelinating lesions or intramedullary signal change, T11/12 central disc protrusion to left side abutting but compressing spinal cord; agree with report as above otherwise         Impression/ Plan:  Daina Neves is a 34 year old female with PMHx significant for anxiety and bicuspid aortic valve, who originally presented 11/3/2023, for evaluation of numbness over the chest below the nipple line.     Neurologic exam initially demonstrated isolated decreased sensation over the R torso anteriorly mainly below the nipple line ~T4/5 level but in a localized area rather than band like distribution with brisk deep tendon reflexes and +pitts's sign, less apparent today - ? If related to low B12 as this was previously low - recommend remain on B12 supplement for now .      Given localized discomfort persistent despite modification of her bra strap which could cause localized nerve compression - MRI C/T spine was recommended and completed to evaluate for possible myelopathy.  This was done  and showed no significant stenosis or evidence for demyelinating disease. There was some mild disc protrusion at T11/12, but this is below the region of her symptoms and likely incidental. In addition, her symptoms have improved and she denies pain with symptoms and does not desire neuropathic pain medication; given improvement, recommend watchful waiting over time; will check B12 level; discussed warning signs for cord compression and/or neuropathy and advised to call office sooner with new or worsening symptoms.     1. Thoracic neuralgia  As noted above   - Vitamin B12; Future    2. Low serum vitamin B12  As noted above   - Vitamin B12; Future    Return in about 1 year (around 10/25/2025).    Hernesto Dobbs MD, Neurology  [facility]  Pager [phone]  10/25/2024

## 2024-10-25 NOTE — PATIENT INSTRUCTIONS
Refill policies:    Allow 2-3 business days for refills; controlled substances may take longer.  Contact your pharmacy at least 5 days prior to running out of medication and have them send an electronic request or submit request through the “request refill” option in your [employer] account.  Refills are not addressed on weekends; covering physicians do not authorize routine medications on weekends.  No narcotics or controlled substances are refilled after noon on Fridays or by on call physicians.  By law, narcotics must be electronically prescribed.  A 30 day supply with no refills is the maximum allowed.  If your prescription is due for a refill, you may be due for a follow up appointment.  To best provide you care, patients receiving routine medications need to be seen at least once a year.  Patients receiving narcotic/controlled substance medications need to be seen at least once every 3 months.  In the event that your preferred pharmacy does not have the requested medication in stock (e.g. Backordered), it is your responsibility to find another pharmacy that has the requested medication available.  We will gladly send a new prescription to that pharmacy at your request.    Scheduling Tests:    If your physician has ordered radiology tests such as MRI or CT scans, please contact Central Scheduling at [phone] right away to schedule the test.  Once scheduled, the [facility] Centralized Referral Team will work with your insurance carrier to obtain pre-certification or prior authorization.  Depending on your insurance carrier, approval may take 3-10 days.  It is highly recommended patients assure they have received an authorization before having a test performed.  If test is done without insurance authorization, patient may be responsible for the entire amount billed.      Precertification and Prior Authorizations:  If your physician has recommended that you have a procedure or additional testing performed the [facility]  Centralized Referral Team will contact your insurance carrier to obtain pre-certification or prior authorization.    You are strongly encouraged to contact your insurance carrier to verify that your procedure/test has been approved and is a COVERED benefit.  Although the [facility] Centralized Referral Team does its due diligence, the insurance carrier gives the disclaimer that \"Although the procedure is authorized, this does not guarantee payment.\"    Ultimately the patient is responsible for payment.   Thank you for your understanding in this matter.  Paperwork Completion:  If you require FMLA or disability paperwork for your recovery, please make sure to either drop it off or have it faxed to our office at [phone]. Be sure the form has your name and date of birth on it.  The form will be faxed to our Forms Department and they will complete it for you.  There is a 25$ fee for all forms that need to be filled out.  Please be aware there is a 10-14 day turnaround time.  You will need to sign a release of information (DEXTER) form if your paperwork does not come with one.  You may call the Forms Department with any questions at [phone].  Their fax number is [phone].

## 2024-11-07 ENCOUNTER — LAB ENCOUNTER (OUTPATIENT)
Dept: LAB | Age: 34
End: 2024-11-07
Attending: Other
Payer: COMMERCIAL

## 2024-11-07 DIAGNOSIS — M79.2 THORACIC NEURALGIA: ICD-10-CM

## 2024-11-07 DIAGNOSIS — E53.8 LOW SERUM VITAMIN B12: ICD-10-CM

## 2024-11-07 LAB — VIT B12 SERPL-MCNC: 735 PG/ML (ref 211–911)

## 2024-11-07 PROCEDURE — 82607 VITAMIN B-12: CPT

## 2024-11-07 PROCEDURE — 36415 COLL VENOUS BLD VENIPUNCTURE: CPT

## 2024-11-18 ENCOUNTER — OFFICE VISIT (OUTPATIENT)
Dept: FAMILY MEDICINE CLINIC | Facility: CLINIC | Age: 34
End: 2024-11-18
Payer: COMMERCIAL

## 2024-11-18 VITALS
SYSTOLIC BLOOD PRESSURE: 121 MMHG | OXYGEN SATURATION: 98 % | BODY MASS INDEX: 29.6 KG/M2 | WEIGHT: 182 LBS | RESPIRATION RATE: 22 BRPM | DIASTOLIC BLOOD PRESSURE: 76 MMHG | HEART RATE: 96 BPM | TEMPERATURE: 98 F | HEIGHT: 65.9 IN

## 2024-11-18 DIAGNOSIS — Z13.228 SCREENING FOR ENDOCRINE, NUTRITIONAL, METABOLIC AND IMMUNITY DISORDER: ICD-10-CM

## 2024-11-18 DIAGNOSIS — R20.0 NUMBNESS AND TINGLING: ICD-10-CM

## 2024-11-18 DIAGNOSIS — Z13.29 SCREENING FOR ENDOCRINE, NUTRITIONAL, METABOLIC AND IMMUNITY DISORDER: ICD-10-CM

## 2024-11-18 DIAGNOSIS — Z13.0 SCREENING FOR ENDOCRINE, NUTRITIONAL, METABOLIC AND IMMUNITY DISORDER: ICD-10-CM

## 2024-11-18 DIAGNOSIS — R20.2 NUMBNESS AND TINGLING: ICD-10-CM

## 2024-11-18 DIAGNOSIS — Q23.81 BICUSPID AORTIC VALVE: ICD-10-CM

## 2024-11-18 DIAGNOSIS — Z13.21 SCREENING FOR ENDOCRINE, NUTRITIONAL, METABOLIC AND IMMUNITY DISORDER: ICD-10-CM

## 2024-11-18 DIAGNOSIS — E78.00 PURE HYPERCHOLESTEROLEMIA: ICD-10-CM

## 2024-11-18 DIAGNOSIS — I35.1 NONRHEUMATIC AORTIC VALVE INSUFFICIENCY: ICD-10-CM

## 2024-11-18 DIAGNOSIS — Z00.00 ANNUAL PHYSICAL EXAM: Primary | ICD-10-CM

## 2024-11-18 DIAGNOSIS — E53.8 B12 DEFICIENCY: ICD-10-CM

## 2024-11-18 LAB
ALBUMIN SERPL-MCNC: 4.6 G/DL (ref 3.2–4.8)
ALBUMIN/GLOB SERPL: 1.5 {RATIO} (ref 1–2)
ALP LIVER SERPL-CCNC: 60 U/L
ALT SERPL-CCNC: 20 U/L
ANION GAP SERPL CALC-SCNC: 5 MMOL/L (ref 0–18)
AST SERPL-CCNC: 19 U/L (ref ?–34)
BASOPHILS # BLD AUTO: 0.07 X10(3) UL (ref 0–0.2)
BASOPHILS NFR BLD AUTO: 1.3 %
BILIRUB SERPL-MCNC: 0.4 MG/DL (ref 0.3–1.2)
BUN BLD-MCNC: 16 MG/DL (ref 9–23)
CALCIUM BLD-MCNC: 10.2 MG/DL (ref 8.7–10.4)
CHLORIDE SERPL-SCNC: 110 MMOL/L (ref 98–112)
CHOLEST SERPL-MCNC: 180 MG/DL (ref ?–200)
CO2 SERPL-SCNC: 26 MMOL/L (ref 21–32)
CREAT BLD-MCNC: 0.91 MG/DL
EGFRCR SERPLBLD CKD-EPI 2021: 84 ML/MIN/1.73M2 (ref 60–?)
EOSINOPHIL # BLD AUTO: 0.08 X10(3) UL (ref 0–0.7)
EOSINOPHIL NFR BLD AUTO: 1.5 %
ERYTHROCYTE [DISTWIDTH] IN BLOOD BY AUTOMATED COUNT: 13 %
FASTING PATIENT LIPID ANSWER: YES
FASTING STATUS PATIENT QL REPORTED: YES
GLOBULIN PLAS-MCNC: 3.1 G/DL (ref 2–3.5)
GLUCOSE BLD-MCNC: 102 MG/DL (ref 70–99)
HCT VFR BLD AUTO: 42.5 %
HDLC SERPL-MCNC: 52 MG/DL (ref 40–59)
HGB BLD-MCNC: 14.1 G/DL
IMM GRANULOCYTES # BLD AUTO: 0.01 X10(3) UL (ref 0–1)
IMM GRANULOCYTES NFR BLD: 0.2 %
LDLC SERPL CALC-MCNC: 114 MG/DL (ref ?–100)
LYMPHOCYTES # BLD AUTO: 1.84 X10(3) UL (ref 1–4)
LYMPHOCYTES NFR BLD AUTO: 34.9 %
MCH RBC QN AUTO: 28.7 PG (ref 26–34)
MCHC RBC AUTO-ENTMCNC: 33.2 G/DL (ref 31–37)
MCV RBC AUTO: 86.6 FL
MONOCYTES # BLD AUTO: 0.65 X10(3) UL (ref 0.1–1)
MONOCYTES NFR BLD AUTO: 12.3 %
NEUTROPHILS # BLD AUTO: 2.62 X10 (3) UL (ref 1.5–7.7)
NEUTROPHILS # BLD AUTO: 2.62 X10(3) UL (ref 1.5–7.7)
NEUTROPHILS NFR BLD AUTO: 49.8 %
NONHDLC SERPL-MCNC: 128 MG/DL (ref ?–130)
OSMOLALITY SERPL CALC.SUM OF ELEC: 293 MOSM/KG (ref 275–295)
PLATELET # BLD AUTO: 241 10(3)UL (ref 150–450)
POTASSIUM SERPL-SCNC: 4.1 MMOL/L (ref 3.5–5.1)
PROT SERPL-MCNC: 7.7 G/DL (ref 5.7–8.2)
RBC # BLD AUTO: 4.91 X10(6)UL
SODIUM SERPL-SCNC: 141 MMOL/L (ref 136–145)
TRIGL SERPL-MCNC: 75 MG/DL (ref 30–149)
TSI SER-ACNC: 1.14 UIU/ML (ref 0.55–4.78)
VLDLC SERPL CALC-MCNC: 13 MG/DL (ref 0–30)
WBC # BLD AUTO: 5.3 X10(3) UL (ref 4–11)

## 2024-11-18 PROCEDURE — 83036 HEMOGLOBIN GLYCOSYLATED A1C: CPT | Performed by: FAMILY MEDICINE

## 2024-11-18 PROCEDURE — 80061 LIPID PANEL: CPT | Performed by: FAMILY MEDICINE

## 2024-11-18 PROCEDURE — 80050 GENERAL HEALTH PANEL: CPT | Performed by: FAMILY MEDICINE

## 2024-11-18 NOTE — PROGRESS NOTES
REASON FOR VISIT:    Daina Neves is a 34 year old female who presents for an Annual Health Assessment.    History of numbness on the right lateral chest wall just below her bra strap greater than 2 years.  She states it started as a 3 to 4 cm area and feels like over the past couple months it is now spreading to the sides of her chest wall laterally.  She has no chronic back pain or back issues.  Had an episode of back pain a few months ago that resolved on its own.  She has no chest pain, shortness of breath, acid reflux, dysphagia, unexplained weight loss, fevers, night sweats, tingling in her arms or legs, urinary retention, double vision, dizziness, cough, rashes, history of shingles.  Notices occasionally of bends at 90 degrees and leans forward can have pain near that area.  Has no other chest discomfort is only associated with mechanical bending.  Completed neurology with Dr. Dobbs and insurance - had negative MRI of cervical and thoracic spine.  not having numbness and tingling in the arms and legs. Symptoms unchanged.      Has history of bicuspid valve with cardiac murmur and has seen a cardiologist, Dr. Ron Ross, regularly.  Last evaluation he informed her that her echo was stable and she did not need to return for at 2 years.  due 2/2025     Birth control on Depo-Provera x8 years.  Does not desire children.  Currently not sexually active.  Due to risk of osteoporosis recommended patient discuss with OB/GYN Mirena or copper T IUD since has family history of blood clots and does not want estrogen containing products.  Patient wants to continue with Depo-Provera.      , monogamous  G0  menses: on Depo through OB/GYN not interested in a IUD  Birth control  Last pap: 9/27/2021-normal  Dr. Nini Ryan MD, PA-[facility]  History of abnormal pap: denies  On vit D daily -2000IU daily  MVI- yes  Calcium-500mg daily  B12- 500MCG daily x 3 months  Colonoscopy- no  Mammogram-  no  Dexa  Exercise - daily 30mins  Diet- no particular diet- \"mindful\" using weight watchers saw  Dentist regularly- yes  Annual eye exam-   Etoh: 2  drinks per week  Cigs: never, no vaping  Illicits: no marijuana, denies  Immunizations:refused flu, pneumonia- will check childhood vaccines for hep a and B  FH significant: reviewed  Family History   Problem Relation Age of Onset    Diabetes Mother     Obesity Mother     Other (blood clots) Mother     Asthma Mother     Cancer Paternal Grandfather         lung CA         Patient Active Problem List   Diagnosis    Bicuspid aortic valve    Murmur    Numbness and tingling    Atypical chest pain    B12 deficiency    Nonrheumatic aortic valve insufficiency    BMI 29.0-29.9,adult    History of anxiety    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      valACYclovir 50 mg/mL Oral Suspension Take 20 mL (1,000 mg total) by mouth every 8 (eight) hours.      Multiple Vitamin (MULTI-DAY VITAMINS) Oral Tab Take by mouth.      calcium carb-cholecalciferol 500-10 MG-MCG Oral Chew Tab Chew 1 tablet by mouth daily.      cholecalciferol 50 MCG (2000 UT) Oral Cap Take by mouth.      MedroxyPROGESTERone Acetate (DEPO-PROVERA) 150 MG/ML Intramuscular Suspension Last given 12/12/2023       Wt Readings from Last 6 Encounters:   11/18/24 182 lb (82.6 kg)   10/25/24 175 lb (79.4 kg)   04/11/24 175 lb (79.4 kg)   03/21/24 180 lb 9.6 oz (81.9 kg)   12/14/23 178 lb (80.7 kg)   11/03/23 175 lb (79.4 kg)     Body mass index is 29.46 kg/m².    No results found for: \"GLUCOSE\"  Lab Results   Component Value Date    CHOLEST 207 (H) 12/14/2023     Lab Results   Component Value Date    HDL 81 (H) 12/14/2023     No results found for: \"TRIGLY\"  Lab Results   Component Value Date     (H) 12/14/2023     Lab Results   Component Value Date    AST  10 (L) 12/14/2023    AST 15 09/28/2023     Lab Results   Component Value Date    ALT 36 12/14/2023    ALT 28 09/28/2023     Lab Results   Component Value Date    TSH 0.965 12/14/2023    TSH 0.946 09/28/2023     Lab Results   Component Value Date    BUN 17 12/14/2023    BUN 13 09/28/2023    CREATSERUM 0.94 12/14/2023    CREATSERUM 0.90 09/28/2023       General Health     How would you describe your current health state?: Good    Type of Diet: Balanced    How do you maintain positive mental well-being?: Social Interaction;Visiting Friends;Visiting Family    How would you describe your daily physical activity?: Heavy    If you are a male age 45-79 or a female age 55-79, do you take aspirin?: No    Have you had any immunizations at another office such as Influenza, Hepatitis B, Tetanus, or Pneumococcal?: No    At any time do you feel concerned for the safety/well-being of yourself and/or your children, in your home or elsewhere?: No     CAGE:     Cut: Have you ever felt you should Cut down on your drinking?: No    Annoyed: Have people Annoyed you by criticizing your drinking?: No    Guilty: Have you ever felt bad or Guilty about your drinking?: No    Eye Opener: Have you ever had a drink first thing in the morning to steady your nerves or to get rid of a hangover (Eye opener)?: No    Scoring  Total Score: 0     Depression Screening (PHQ-2/PHQ-9): Over the LAST 2 WEEKS   Little interest or pleasure in doing things (over the last two weeks)?: Not at all    Feeling down, depressed, or hopeless (over the last two weeks)?: Not at all    PHQ-2 SCORE: 0        PREVENTATIVE SERVICES  INDICATIONS AND SCHEDULE Recommendation Internal Lab or Procedure External Lab or Procedure   Breast Cancer Screening   Every 2 yrs age 50-74 No recommendations at this time    Pap Every 3 yrs age 21-65 or Pap and HPV every 5 yrs age 30-65 Health Maintenance   Topic Date Due    Pap Smear  09/27/2026       Chlamydia Screening Screen Annually age<25,  if sex active/on OCPs; >24 high risk No results found for: \"CHLAMYDIA\"    Colonoscopy Screen Every 10 years No recommendations at this time    Flex Sigmoidoscopy Screen  Every 5 years No results found for this or any previous visit.    Fecal Occult Blood  Annually No results found for: \"FOB\", \"OCCULTSTOOL\"    Obesity Screening Screen all adults annually Body mass index is 29.46 kg/m².      Preventive Services for Which Recommendations Vary with Risk Recommendation Internal Lab or Procedure External Lab or Procedure   Cholesterol Screening Recommended screening varies with age, risk and gender LDL Cholesterol (mg/dL)   Date Value   12/14/2023 115 (H)       Diabetes Screening  if history of high blood pressure or other  risk factors HgbA1C (%)   Date Value   09/28/2023 5.7 (H)     Glucose (mg/dL)   Date Value   12/14/2023 99         Gonorrhea Screening if high risk No results found for: \"GONOCOCCUS\"    HIV Screening For all adults age 18-65, older adults at increased risk No results found for: \"HIV\"    Syphilis Screening Screen if pregnant or high risk No results found for: \"RPR\"    Hepatitis C Screening Screen those at high risk plus screen one time for adults born 1947-1 965 No results found for: \"HCVAB\"    Tuberculosis Screen if high risk No components found for: \"PPDINDURAT\"      Disease Monitoring:    SPECIFIC DISEASE MONITORING Internal Lab or Procedure External Lab or Procedure   Annual Monitoring of Persistent     Medications (ACE/ARB, digoxin, diuretics)    Potassium  Annually Potassium (mmol/L)   Date Value   12/14/2023 4.1         No data to display                Creatinine  Annually Creatinine (mg/dL)   Date Value   12/14/2023 0.94         No data to display                Digoxin Serum Conc  Annually No results found for: \"DIGOXIN\"      No data to display                Diabetes      HgbA1C  Annually HgbA1C (%)   Date Value   09/28/2023 5.7 (H)         No data to display                Creat/alb ratio   Annually Creatinine (mg/dL)   Date Value   12/14/2023 0.94        LDL  Annually LDL Cholesterol (mg/dL)   Date Value   12/14/2023 115 (H)         No data to display                 Dilated Eye exam  Annually      No data to display                   No data to display                Asthma  (Annually between Nov. 1 & Dec. 31)    Date of last AAP/ACT and counseling given on importance of controller meds.                 ALLERGIES:     Allergies   Allergen Reactions    Avocado SHORTNESS OF BREATH and OTHER (SEE COMMENTS)     SOB        CURRENT MEDICATIONS:   Current Outpatient Medications   Medication Sig Dispense Refill    cyanocobalamin 500 MCG Oral Tab Take 1 tablet (500 mcg total) by mouth daily.      valACYclovir 50 mg/mL Oral Suspension Take 20 mL (1,000 mg total) by mouth every 8 (eight) hours.      Multiple Vitamin (MULTI-DAY VITAMINS) Oral Tab Take by mouth.      calcium carb-cholecalciferol 500-10 MG-MCG Oral Chew Tab Chew 1 tablet by mouth daily.      cholecalciferol 50 MCG (2000 UT) Oral Cap Take by mouth.      MedroxyPROGESTERone Acetate (DEPO-PROVERA) 150 MG/ML Intramuscular Suspension Last given 12/12/2023        MEDICAL INFORMATION:   Past Medical History:    Allergic rhinitis    Anxiety    Bicuspid aortic valve    Hyperlipidemia    Nonrheumatic aortic (valve) insufficiency      History reviewed. No pertinent surgical history.   Family History   Problem Relation Age of Onset    Diabetes Mother     Obesity Mother     Other (blood clots) Mother     Asthma Mother     Cancer Paternal Grandfather         lung CA      SOCIAL HISTORY:   Social History     Socioeconomic History    Marital status: Single   Tobacco Use    Smoking status: Never     Passive exposure: Never    Smokeless tobacco: Never   Vaping Use    Vaping status: Never Used   Substance and Sexual Activity    Alcohol use: Yes     Alcohol/week: 3.0 standard drinks of alcohol     Types: 1 Glasses of wine, 2 Standard drinks or equivalent per week      Comment: socially    Drug use: No   Other Topics Concern    Caffeine Concern No    Exercise Yes    Seat Belt Yes    Special Diet No    Stress Concern No    Weight Concern No   Social History Narrative    Single     No kids    Nonsmoker    Occasional EtOH    No drugs         Occ:      : yes        REVIEW OF SYSTEMS:   GENERAL: feels well otherwise  SKIN: denies any unusual skin lesions  EYES: denies blurred vision or double vision  HEENT: denies nasal congestion, sinus pain or ST  LUNGS: denies shortness of breath with exertion  CARDIOVASCULAR: denies chest pain on exertion  GI: denies abdominal pain, denies heartburn  : denies dysuria, vaginal discharge or itching, periods regular   MUSCULOSKELETAL: denies back pain  NEURO: denies headaches  PSYCHE: denies depression or anxiety  HEMATOLOGIC: denies hx of anemia  ENDOCRINE: denies thyroid history  ALL/ASTHMA: denies hx of allergy or asthma    EXAM:   /76 (BP Location: Left arm, Patient Position: Sitting, Cuff Size: adult)   Pulse 96   Temp 98.4 °F (36.9 °C) (Temporal)   Resp 22   Ht 5' 5.9\" (1.674 m)   Wt 182 lb (82.6 kg)   LMP  (LMP Unknown)   SpO2 98%   BMI 29.46 kg/m²    No LMP recorded (lmp unknown). Patient has had an injection.   GENERAL: well developed, well nourished, in no apparent distress  SKIN: no rashes, no suspicious lesions  HEENT: atraumatic, normocephalic, ears and throat are clear  EYES:PERRLA, EOMI, normal optic disk, conjunctiva are clear  NECK: supple, no adenopathy, no bruits  CHEST: no chest tenderness  BREAST: Deferred-has OB/GYN  LUNGS: clear to auscultation  CARDIO: RRR with cardiac Murmur +2/6 heard best at aortic and pulmonic post  GI: good BS's, no masses, HSM or tenderness  : Deferred  RECTAL: Deferred  MUSCULOSKELETAL: back is not tender, FROM of the back  EXTREMITIES: no cyanosis, clubbing or edema  NEURO: Oriented times three, cranial nerves are intact, motor and sensory are grossly  intact    ASSESSMENT AND OTHER RELEVANT CHRONIC CONDITIONS:   Daina Neves is a 34 year old female who presents for an Annual Health Assessment.     PLAN SUMMARY:   1. Annual physical exam  -Encourage Mediterranean diet  -Encouraged exercise 30 minutes to 60 minutes daily x 3-5x weekly for 150 minutes or more to prevent obesity and chronic disease and eliminate stress and its effect on the body.  -encouraged to continue not smoking  -safe sex practices - recommend condom use  -mammogram order placed- if age 40y or older, recommend annual  -self breast exams encouraged monthly  -immunizations-refused vaccines- needs Hep B and A- will bring in childhood records., refused flu shot, pneumonia shot, recommend annual influenza shot in fall  -Vitamin D3  2000 units daily recommended  -Needs 1000 mg of calcium daily for osteoporosis prevention discussed  -thin prep pap recommended every 3 years if normal   - CBC With Differential With Platelet; Future  - Comp Metabolic Panel; Future  - Lipid Panel; Future  - TSH W Reflex To Free T4; Future    2. B12 deficiency  - Neuro Referral - In Network  Continue taking B12 500 mcg daily    3. Nonrheumatic aortic valve insufficiency  4. Bicuspid aortic valve  - Cardio Referral - Internal  Last follow-up with Dr. Ross 2/2023 told to return in 2years since aorta stable due Jan 2025  Monitor if aortic enlargement then consider CTA    5. Numbness and tingling  - Neuro Referral - In Network  Symptoms unchanged not worse  No change with B12  Have annual neuro eval to monitor    6. BMI 29.0-29.9,adult  -Goal BMI 25.0  -weight loss recommended due to increased long term health risks of diabetes, CAD, stroke, HTN and cancer  -follow Mediterranean diet  -options discussed- weight watchers, referral weight loss clinic,  -Minimum exercise 30 minutes 5 days a week aerobic activity goals 150 to 300 minutes weekly.    7. Pure hypercholesteremia  - Lipid Panel; Future  encourage mediterranean  diet  Exercise brisk walk 30-60 mins at least 5 days weekly  Lose weight if overweight  Recheck annually to monitor lifestyle control    8. Screening for endocrine, nutritional, metabolic and immunity disorder  - CBC With Differential With Platelet; Future  - Comp Metabolic Panel; Future  - TSH W Reflex To Free T4; Future        The patient indicates understanding of these issues and agrees to the plan.  Return in about 1 year (around 11/18/2025) for annual physical, fasting labs AM, sooner if needed..    Diet counseling perfomed  Exercise counseling perfomed    SUGGESTED VACCINATIONS - Influenza, Pneumococcal, Zoster, Tetanus     Immunization History   Administered Date(s) Administered    Covid-19 Vaccine Moderna 100 mcg/0.5 ml 06/21/2021, 07/19/2021    TDAP 09/28/2023       Influenza Annually   Pneumococcal if high risk   Td/Tdap once then every 10 years   HPV Females 11-26: 3 doses   Zoster (Shingles) 60 and older: one dose   Varicella 2 doses if not immune   MMR 1-2 doses if born after 1958 and not immune

## 2024-11-18 NOTE — PATIENT INSTRUCTIONS
Perform labs fasting 8 hours with water or black coffee or or black tea diet  soda only prior to exam.    -Encourage healthy diet of whole food and avoid processed food and sugary drinks and sodas.  Diet should include lean meats and vegetables including 5-7 servings of fruit and vegetables total in 1 day.  Never skip breakfast.  -Encouraged exercise 30 minutes to 60 minutes 3-5 times weekly for 150minutes or more to prevent obesity and chronic disease and eliminate stress and its effect on the body.  -encouraged to continue not smoking or vaping  - recommend condom use per CDC recommendation for all  or unmarried couples  -mammogram order given if 40years old or older  - immunizations-annual flu shot recommended  -Vitamin D3  2000 units daily recommended- buy Over-the-counter  -Recommend 1000mg of calcium daily for osteoporosis prevention discussed. Need to ingest 1000mg of calcium daily to prevent osteoporosis later in life.  I.e. one 8 ounce glass of silk [place] milk has 450 mg of calcium and label states 45%.  Labels list calcium percentages not milligrams.  To calculate milligrams per serving remove the percentage and add a zero (0).  I.e. 9% calcium equals 90 mg  -thin prep pap recommended every 3 years-If previous pap was normal  sooner as directed by your doctor.    Exercise for a Healthier Heart     Exercise with a friend. When activity is fun, you're more likely to stick with it.   You may wonder how you can improve the health of your heart. If you’re thinking about exercise, you’re on the right track. You don’t need to become an athlete, but you do need a certain amount of brisk exercise to help strengthen your heart. If you have been diagnosed with a heart condition, your doctor may recommend exercise to help stabilize your condition. To help make exercise a habit, choose safe, fun activities.  Be sure to check with your healthcare provider before starting an exercise program.   Why  exercise?  Exercising regularly offers many healthy rewards. It can help you do all of the following:  Improve your blood cholesterol level to help prevent further heart trouble  Lower your blood pressure to help prevent a stroke or heart attack  Control diabetes, or reduce your risk of getting this disease  Improve your heart and lung function  Reach and maintain a healthy weight  Make your muscles stronger and more limber so you can stay active  Prevent falls and fractures by slowing the loss of bone mass (osteoporosis)  Manage stress better  Reduce your blood pressure  Improve your sense of self and your body image  Exercise tips  Ease into your routine. Set small goals. Then build on them.  Exercise on most days. Aim for a total of 150 or more minutes of moderate to  vigorous intensity activity each week. Consider 40 minutes, 3 to 4 times a week. For best results, activity should last for 40 minutes on average. It is OK to work up to the 40 minute period over time. Examples of moderate-intensity activity is walking 1 mile in 15 minutes or 30 to 45 minutes of yard work.  Step up your daily activity level. Along with your exercise program, try being more active throughout the day. Walk instead of drive. Do more household tasks or yard work.  Choose one or more activities you enjoy. Walking is one of the easiest things you can do. You can also try swimming, riding a bike, dancing, or taking an exercise class.  Stop exercising and call your doctor if you:  Have chest pain or feel dizzy or lightheaded  Feel burning, tightness, pressure, or heaviness in your chest, neck, shoulders, back, or arms  Have unusual shortness of breath  Have increased joint or muscle pain  Have palpitations or an irregular heartbeat   Date Last Reviewed: 5/1/2016 © 2000-2018 [employer]. [address] [place] [id number]. All rights reserved. This information is not intended as a substitute for professional medical  care. Always follow your healthcare professional's instructions.      Eating Heart-Healthy Foods  Eating has a big impact on your heart health. In fact, eating healthier can improve several of your heart risks at once. For instance, it helps you manage weight, cholesterol, and blood pressure. Here are ideas to help you make heart-healthy changes without giving up all the foods and flavors you love.  Getting started  Talk with your healthcare provider about eating plans, such as the DASH or Mediterranean diet. You may also be referred to a dietitian.  Change a few things at a time. Give yourself time to get used to a few eating changes before adding more.  Work to create a tasty, healthy eating plan that you can stick to for the rest of your life.    Goals for healthy eating  Below are some tips to improve your eating habits:  Limit saturated fats and trans fats. Saturated fats raise your levels of cholesterol, so keep these fats to a minimum. They are found in foods such as fatty meats, whole milk, cheese, and palm and coconut oils. Avoid trans fats because they lower good cholesterol as well as raise bad cholesterol. Trans fats are most often found in processed foods.  Reduce sodium (salt) intake. Eating too much salt may increase your blood pressure. Limit your sodium intake to 2,300 milligrams (mg) per day (the amount in 1 teaspoon of salt), or less if your healthcare provider recommends it. Dining out less often and eating fewer processed foods are two great ways to decrease the amount of salt you consume.  Managing calories. A calorie is a unit of energy. Your body burns calories for fuel, but if you eat more calories than your body burns, the extras are stored as fat. Your healthcare provider can help you create a diet plan to manage your calories. This will likely include eating healthier foods as well as exercising regularly. To help you track your progress, keep a diary to record what you eat and how often  you exercise.  Choose the right foods  Aim to make these foods staples of your diet. If you have diabetes, you may have different recommendations than what is listed here:  Fruits and vegetables provide plenty of nutrients without a lot of calories. At meals, fill half your plate with these foods. Split the other half of your plate between whole grains and lean protein.  Whole grains are high in fiber and rich in vitamins and nutrients. Good choices include whole-wheat bread, pasta, and brown rice.  Lean proteins give you nutrition with less fat. Good choices include fish, skinless chicken, and beans.  Low-fat or nonfat dairy provides nutrients without a lot of fat. Try low-fat or nonfat milk, cheese, or yogurt.  Healthy fats can be good for you in small amounts. These are unsaturated fats, such as olive oil, nuts, and fish. Try to have at least 2 servings per week of fatty fish, such as salmon, sardines, mackerel, rainbow trout, and albacore tuna. These contain omega-3 fatty acids, which are good for your heart. Flaxseed is another source of a heart-healthy fat.  More on heart-healthy eating  Read food labels  Healthy eating starts at the grocery store. Be sure to pay attention to food labels on packaged foods. Look for products that are high in fiber and protein, and low in saturated fat, cholesterol, and sodium. Avoid products that contain trans fat. And pay close attention to serving size. For instance, if you plan to eat two servings, double all the numbers on the label.  Prepare food right  A key part of healthy cooking is cutting down on added fat and salt. Look on the internet for lower-fat, lower-sodium recipes. Also, try these tips:  Remove fat from meat and skin from poultry before cooking.  Skim fat from the surface of soups and sauces.  Broil, boil, bake, steam, grill, and microwave food without added fats.  Choose ingredients that spice up your food without adding calories, fat, or sodium. Try these  items: horseradish, hot sauce, lemon, mustard, nonfat salad dressings, and vinegar. For salt-free herbs and spices, try basil, cilantro, cinnamon, pepper, and rosemary.  Date Last Reviewed: 10/1/2017  © 4008-2540 [employer]. [address] [place] [address]. All rights reserved. This information is not intended as a substitute for professional medical care. Always follow your healthcare professional's instructions.       What Is Osteoporosis?  Osteoporosis is a disease that weakens the bones. Weakened bones are more likely to break (fracture). Osteoporosis affects both men and women. But postmenopausal women are most at risk. To help prevent osteoporosis, you need to exercise and nourish your bones throughout your life.    Childhood  The body builds the most bone during these years. That's why boys and girls need foods rich in calcium. They also need plenty of exercise. A healthy diet and exercise helps bones grow strong.  Young adulthood to age 30  During young adulthood, bones become their strongest. This is called peak bone mass. The same good habits that kept bones healthy in childhood help keep bones healthy in adulthood.  Age 30 to menopause  Bone mass declines slightly during these years. Your body makes just enough new bone to maintain peak bone mass. To keep your bones at their peak mass, be sure to exercise and get plenty of calcium.  After menopause  Menopause is when a woman stops having monthly periods. After menopause, the body makes less estrogen (female hormone). This increases bone loss. At this point, treatment may be needed to reduce the risk for fracture. Exercise and calcium can also help keep your bones strong.  Later in life  In later years, both men and women need to take extra care of their bones. By this point, the body loses more bone than it makes. If too much bone is lost, you may be at increased risk for fractures. With age, the quality and quantity of bone  declines. You can lessen bone loss by staying active and increasing your calcium intake. Calcium supplements and other osteoporosis treatments do have risks. So talk with your healthcare provider if you have concerns. If you have osteoporosis, you can also learn ways to increase everyday safety.  Date Last Reviewed: 5/1/2018  © 1618-2665 [employer]. [address] [place] [address], [place] [address]. All rights reserved. This information is not intended as a substitute for professional medical care. Always follow your healthcare professional's instructions.          Preventing Osteoporosis: Meeting Your Calcium Needs    Your body needs calcium to build and repair bones. But it can't make calcium on its own. That's why it's important to eat calcium-rich foods. Some foods are naturally rich in calcium. Others have calcium added (fortified). It's best to get calcium from the foods you eat. But if you can't get enough, you may want to take calcium supplements. To meet your daily calcium needs, try the foods listed below.               Dairy Fish & beans Other sources   Source   Calcium (mg) per serving   Source   Calcium (mg) per serving   Source   Calcium (mg) per serving   Low-fat yogurt, plain   415 mg/8 oz.   Sardines, Atlantic, canned, with bones   351 mg/3 oz.   Oatmeal, instant, fortified   215 mg/1 cup   Nonfat milk   302 mg/1 cup   Elsberry, sockeye, canned, with bones   239 mg/3 oz.   Tofu made with calcium sulfate   204 mg/3 oz.   Low-fat milk   297 mg/1 cup   Soybeans, fresh, boiled   131 mg/1/2 cup   Collards   179 mg/1/2 cup   Swiss cheese   272 mg/1 oz.   White beans, cooked   81 mg/1/2 cup   English muffin, whole wheat   175 mg/1 muffin   Cheddar cheese   205 mg/1 oz.   Navy beans, cooked   79 mg/1/2 cup   Kale   90 mg/1/2 cup   Ice cream strawberry   79 mg/1/2 cup           Orange, navel   56 mg/1 medium   Note: Calcium levels may vary depending on brand and size.  Daily calcium needs  14 to 18  years old: 1,300 mg  19 to 30 years old: 1,000 mg  31 to 50 years old: 1,000 mg  51 to 70 years old, women: 1,200 mg  51 to 70 years old, men: 1,000 mg  Pregnant or nursin to 18 years old: 1,300 mg, 19 to 50 years old: 1,000 mg  Older than 70 (women and men): 1,200 mg   Date Last Reviewed: 2018-2018 [employer]. [address] [place] [address]. All rights reserved. This information is not intended as a substitute for professional medical care. Always follow your healthcare professional's instructions.          Vitamin D  Does this test have other names?  25-hydroxyvitamin D (25-high-DROX-ee-VIE-tuh-min D), 25(OH)D  What is this test?  Vitamin D is mainly found in fortified dairy foods, juice, breakfast cereal, and certain fish. This vitamin plays many roles in the body. But because it helps the body absorb calcium from foods and supplements, it's particularly important for bone health. Vitamin D has many additional roles in the body.  Vitamin D comes in several forms. When ultraviolet light, such as sunlight, hits your skin, it creates vitamin D3. D2 is used to fortify dairy foods. Both of these are further processed by your liver and kidneys into a form your body can use. Most tests for vitamin D check the level of a form circulating in the body called 25-hydroxyvitamin D, also called 25(OH)D.   Why do I need this test?  You may need this test if your healthcare provider wants to check your vitamin D levels to find out if you have any risks to bone health. These might be:  Low calcium  Soft bones caused by low vitamin D or problems using it (osteomalacia)  Osteopenia  Osteoporosis  Rickets, in children  You may also need this test if you are at risk for low vitamin D levels. Risks include:  Being an older adult  Having difficulty absorbing fat from your diet  Having chronic kidney disease  Have dark skin pigmentation  Being a  baby  Vitamin D has many effects in the  body. You may need this test to help your healthcare provider diagnose or treat:  Problems with the parathyroid gland  Cancer  Autoimmune diseases, such as multiple sclerosis and Crohn's disease  Psoriasis  Asthma  Weakness or falls    What other tests might I have along with this test?  A healthcare provider may also want to check your parathyroid hormone levels and your calcium levels.   What do my test results mean?  Test results may vary depending on your age, gender, health history, the method used for the test, and other things. Your test results may not mean you have a problem. Ask your healthcare provider what your test results mean for you.   Children and adults need more than 30 nanograms per milliliter (ng/ml) of vitamin D. The optimal level of 25(OH)D is usually between 30 and 60 ng/mL. Recommended daily amounts range from 400 to 800 international units (IU) per day based on your age.  Levels lower than normal can mean you are:  Not making enough vitamin D on your own  Not getting enough vitamin D in your diet  Not absorbing vitamin D from your food as you should  Lower levels may also mean that your body is not converting the vitamin as it should. This might be because of kidney or liver disease.  Above-normal levels may be a sign that you're taking too much in supplement form.   How is this test done?  The test is done with a blood sample. A needle is used to draw blood from a vein in your arm or hand.   Does this test pose any risks?  Having a blood test with a needle carries some risks. These include bleeding, infection, bruising, and feeling lightheaded. When the needle pricks your arm or hand, you may feel a slight sting or pain. Afterward, the site may be sore.   What might affect my test results?  The amount of time you spend in the sunlight, your diet, and whether you take vitamin D in supplement form can affect your vitamin D levels. Ask your healthcare provider if any health conditions you  have or medicines you take could affect your results.  How do I get ready for this test?  Tell your healthcare provider if you take vitamin D supplements. Be sure your healthcare provider knows about all medicines, herbs, vitamins, and supplements you are taking. This includes medicines that don't need a prescription and any illicit drugs you may use.   © 8662-4064 [employer]. [address] [place] [address]. All rights reserved. This information is not intended as a substitute for professional medical care. Always follow your healthcare professional's instructions.          Preventing Osteoporosis: Avoiding Bone Loss  Certain factors can speed up bone loss or decrease bone growth. For example, alcohol, cigarettes, and certain medicines reduce bone mass. Some foods make it hard for your body to absorb calcium.    Things to avoid  Here are things to avoid to help prevent osteoporosis:  Alcohol. This is toxic to bones. It is a major cause of bone loss. Heavy drinking can cause osteoporosis even if you have no other risk factors.  Smoking. This reduces bone mass. Smoking may also interfere with estrogen levels and cause early menopause.  Inactivity. Not being active makes your bones lose strength and become thinner. Over time, thin bones may break. Women who aren't active are at a high risk for osteoporosis.  Certain medicines. Some medicines, such as cortisone, increase bone loss. They also decrease bone growth. Ask your healthcare provider about any side effects of your medicines, and how to prevent them.  Protein-rich or salty foods. Eaten in large amounts, these foods may deplete calcium.  Caffeine. This increases calcium loss. People who drink a lot of coffee, tea, or soda lose more calcium than those who don't.  Date Last Reviewed: 5/1/2018  © 9625-4307 [employer]. [address] [place] [address]. All rights reserved. This information is not intended as a  substitute for professional medical care. Always follow your healthcare professional's instructions.          Living with Osteoporosis: Regular Exercise  If you have osteoporosis, exercise is vital for your health. It can prevent bone fractures and spine changes. It will slow bone loss. Exercise will strengthen your body. It can also be fun. A variety of exercises is best. See below for exercises that can help you. But before you start, talk with your healthcare provider to be sure these exercises are right for you.    Resistance exercises. These build muscle strength and maintain bone mass. They also make you less prone to injury. Exercises include lifting small weights, doing push-ups and sit-ups, using elastic exercise bands, and using weight machines.  Weight-bearing activities. These help your whole body. They also help you maintain bone mass. Activities include walking, dancing, and housework.  Non-weight-bearing exercises. These help prevent back strain and pain. They do this by building the trunk and leg muscles. Exercises that help with flexibility can prevent falls. Examples include swimming, water exercise, and stretching.  Staying safe  Here are tips to stay safe:   Always check with your healthcare provider before starting any new exercise program.  Use weights only as instructed.  Stop any exercise that causes pain.   Date Last Reviewed: 5/1/2018  © 2216-6476 [employer]. [address] [place] [address], [place] [address]. All rights reserved. This information is not intended as a substitute for professional medical care. Always follow your healthcare professional's instructions.

## 2024-11-20 DIAGNOSIS — R73.01 ELEVATED FASTING GLUCOSE: Primary | ICD-10-CM

## 2024-11-21 LAB
EST. AVERAGE GLUCOSE BLD GHB EST-MCNC: 114 MG/DL (ref 68–126)
HBA1C MFR BLD: 5.6 % (ref ?–5.7)

## 2024-12-14 ENCOUNTER — HOSPITAL ENCOUNTER (OUTPATIENT)
Age: 34
Discharge: HOME | End: 2024-12-14
Payer: COMMERCIAL

## 2024-12-14 VITALS
OXYGEN SATURATION: 98 % | DIASTOLIC BLOOD PRESSURE: 81 MMHG | HEIGHT: 64 IN | HEART RATE: 115 BPM | WEIGHT: 173 LBS | BODY MASS INDEX: 29.53 KG/M2 | SYSTOLIC BLOOD PRESSURE: 141 MMHG | RESPIRATION RATE: 18 BRPM | TEMPERATURE: 99 F

## 2024-12-14 DIAGNOSIS — H10.33 ACUTE BACTERIAL CONJUNCTIVITIS OF BOTH EYES: Primary | ICD-10-CM

## 2024-12-14 DIAGNOSIS — J06.9 VIRAL URI WITH COUGH: ICD-10-CM

## 2024-12-14 LAB — S PYO AG THROAT QL: NEGATIVE

## 2024-12-14 PROCEDURE — 87880 STREP A ASSAY W/OPTIC: CPT | Performed by: NURSE PRACTITIONER

## 2024-12-14 PROCEDURE — 99203 OFFICE O/P NEW LOW 30 MIN: CPT | Performed by: NURSE PRACTITIONER

## 2024-12-14 RX ORDER — OFLOXACIN 3 MG/ML
1 SOLUTION/ DROPS OPHTHALMIC 4 TIMES DAILY
Qty: 10 ML | Refills: 0 | Status: SHIPPED | OUTPATIENT
Start: 2024-12-14 | End: 2024-12-14

## 2024-12-14 RX ORDER — OFLOXACIN 3 MG/ML
1 SOLUTION/ DROPS OPHTHALMIC 4 TIMES DAILY
Qty: 10 ML | Refills: 0 | Status: SHIPPED | OUTPATIENT
Start: 2024-12-14 | End: 2024-12-19

## 2024-12-14 NOTE — ED PROVIDER NOTES
Patient Seen in: [facility]      History     Chief Complaint   Patient presents with    Cold     Potential pink eye and strep. - Entered by patient     Stated Complaint: Cold - Potential pink eye and strep.  Subjective:   34-year-old female with bicuspid aortic valve, anxiety, hyperlipidemia presents from home.  Patient is here with eye redness.  States she woke up yesterday with some crusting.  Then developed mucus to the eyes and redness.  Woke up this morning with both eyes crusted shut.  No significant pain or itching.  No vision changes.  She does wear contact lenses but has not had them in today.  She also notes that she has had cold symptoms all week.  Some coughing and sore throat.  States the severity of the throat pain increased today.  She is concerned that she may have strep because she has a history of recurrent strep.  No fever.  She has been taking DayQuil at home for her symptoms.  No COVID testing done at home.    The history is provided by the patient. No  was used.     Objective:   Past Medical History:    Allergic rhinitis    Anxiety    Bicuspid aortic valve    Hyperlipidemia    Nonrheumatic aortic (valve) insufficiency            History reviewed. No pertinent surgical history.           Social History     Socioeconomic History    Marital status: Single   Tobacco Use    Smoking status: Never     Passive exposure: Never    Smokeless tobacco: Never   Vaping Use    Vaping status: Never Used   Substance and Sexual Activity    Alcohol use: Yes     Alcohol/week: 3.0 standard drinks of alcohol     Types: 1 Glasses of wine, 2 Standard drinks or equivalent per week     Comment: socially    Drug use: No   Other Topics Concern    Caffeine Concern No    Exercise Yes    Seat Belt Yes    Special Diet No    Stress Concern No    Weight Concern No   Social History Narrative    Single     No kids    Nonsmoker    Occasional EtOH    No drugs                 Review of Systems    Positive for stated complaint: Cold (Potential pink eye and strep. - Entered by patient)    Other systems are as noted in HPI.  Constitutional and vital signs reviewed.      All other systems reviewed and negative except as noted above.    Physical Exam     ED Triage Vitals [12/14/24 1022]   /81   Pulse 115   Resp 18   Temp 98.6 °F (37 °C)   Temp src Oral   SpO2 98 %   O2 Device None (Room air)     Current:/81   Pulse 115   Temp 98.6 °F (37 °C) (Oral)   Resp 18   Ht 162.6 cm (5' 4\")   Wt 78.5 kg   LMP  (LMP Unknown)   SpO2 98%   BMI 29.70 kg/m²   Right Eye Chart Acuity: 20/25, Corrected  Left Eye Chart Acuity: 20/25, Corrected  Physical Exam  Vitals and nursing note reviewed.   Constitutional:       General: She is not in acute distress.     Appearance: Normal appearance. She is not ill-appearing or toxic-appearing.   HENT:      Head: Normocephalic and atraumatic.      Nose: Nose normal.      Mouth/Throat:      Mouth: Mucous membranes are moist.      Pharynx: Oropharynx is clear. No pharyngeal swelling or posterior oropharyngeal erythema.      Tonsils: No tonsillar exudate.   Eyes:      General: Lids are normal. Vision grossly intact.      Extraocular Movements: Extraocular movements intact.      Conjunctiva/sclera:      Right eye: Right conjunctiva is injected. Exudate present.      Left eye: Left conjunctiva is injected. Exudate present.      Pupils: Pupils are equal, round, and reactive to light.      Comments: No orbital swelling or tenderness.  No entrapment   Cardiovascular:      Rate and Rhythm: Normal rate and regular rhythm.      Pulses: Normal pulses.      Comments: \"Click\" noted, history of bicuspid aortic valve  Pulmonary:      Effort: Pulmonary effort is normal. No respiratory distress.      Breath sounds: Normal breath sounds.      Comments: Lungs clear.  No adventitious lung sounds.  No distress.  No hypoxia.  Pulse ox 98% ra. Which is normal    Abdominal:       General: Abdomen is flat.      Palpations: Abdomen is soft.   Musculoskeletal:         General: No signs of injury. Normal range of motion.      Cervical back: Normal range of motion and neck supple.   Skin:     General: Skin is warm and dry.      Capillary Refill: Capillary refill takes less than 2 seconds.   Neurological:      General: No focal deficit present.      Mental Status: She is alert and oriented to person, place, and time.      GCS: GCS eye subscore is 4. GCS verbal subscore is 5. GCS motor subscore is 6.   Psychiatric:         Mood and Affect: Mood normal.         Behavior: Behavior normal.         Thought Content: Thought content normal.         Judgment: Judgment normal.         ED Course   Radiology:  No results found.  Labs Reviewed   POCT RAPID STREP - Normal     Recent Results (from the past 24 hours)   POCT Rapid Strep    Collection Time: 12/14/24 10:42 AM   Result Value Ref Range    POCT Rapid Strep Negative Negative     MDM     Medical Decision Making  Differential diagnoses reflecting the complexity of care include: Viral URI, strep, COVID, conjunctivitis, corneal abrasion  Patient with URI symptoms for about 1 week.  Lungs are clear.  No distress.  No hypoxia.  Pulse ox 98% room air which is normal.  Low suspicion for pneumonia.  No indication for chest x-ray  Patient declined COVID testing  Recent increase in throat pain.  History of recurrent strep.  Rapid strep is negative.  No PTA.  She does have bilateral conjunctival erythema and exudate.  She does wear contact lenses but she does not have any significant pain and symptoms are bilateral.  No suspicion for corneal abrasion or indication for fluorescein exam today.  No evidence of orbital cellulitis  Symptoms appear viral    Plan of Care: May continue DayQuil.  Push fluids.  Throat lozenges.  Ofloxacin for conjunctivitis, although this may be viral.  Patient was instructed not to use her contacts for the next week and to open a new  pair when she does.  Needs follow-up with primary doctor if no improvement    Results and plan of care discussed with the patient/family. They are in agreement with discharge. They understand to follow up with their primary doctor or the referral physician for further evaluation, especially if no improvement.  Also discussed the limitations of immediate care, patient is aware that if symptoms are worse they should go to the emergency room. Verbal and written discharge instructions were given.     My independent interpretation of studies of: Strep negative  Diagnostic tests and medications considered but not ordered were: No indication for oral antibiotics today.  No indication for fluorescein exam  Shared decision making was done by: Patient declined COVID testing  Comorbidities that add complexity to management include: Bicuspid aortic valve, hyperlipidemia, anxiety  External chart review was done and was noted: Reviewed, routine primary care, visits for thoracic neuralgia  History obtained by an independent source was from: N/A  Discussions and management was done with: N/A  Social determinants of health that affect care: N/A              Problems Addressed:  Acute bacterial conjunctivitis of both eyes: acute illness or injury  Viral URI with cough: acute illness or injury    Amount and/or Complexity of Data Reviewed  Labs: ordered. Decision-making details documented in ED Course.    Risk  OTC drugs.  Prescription drug management.        Disposition and Plan     Clinical Impression:  1. Acute bacterial conjunctivitis of both eyes    2. Viral URI with cough         Disposition:  Discharge  12/14/2024 10:45 am    Follow-up:  Krystle Ramires DO  [address] [facility] [address]  [phone]                Medications Prescribed:  Discharge Medication List as of 12/14/2024 10:47 AM

## 2024-12-14 NOTE — DISCHARGE INSTRUCTIONS
Strep is negative.  Continue DayQuil as needed for symptoms.  Salt water gargles, Tylenol, Motrin, throat lozenges.  Drink plenty of fluids.  Use the eyedrops as directed.  Do not wear contacts for the next week.  Make sure you use a fresh set.  Follow-up with your primary doctor if no improved

## 2025-06-29 ENCOUNTER — HOSPITAL ENCOUNTER (OUTPATIENT)
Age: 35
Discharge: HOME | End: 2025-06-29
Payer: COMMERCIAL

## 2025-06-29 VITALS
DIASTOLIC BLOOD PRESSURE: 85 MMHG | OXYGEN SATURATION: 98 % | TEMPERATURE: 98 F | SYSTOLIC BLOOD PRESSURE: 139 MMHG | HEART RATE: 100 BPM | RESPIRATION RATE: 18 BRPM

## 2025-06-29 DIAGNOSIS — R10.9 ABDOMINAL PAIN, UNSPECIFIED ABDOMINAL LOCATION: Primary | ICD-10-CM

## 2025-06-29 LAB
B-HCG UR QL: NEGATIVE
BILIRUB UR QL STRIP: NEGATIVE
CLARITY UR: CLEAR
COLOR UR: YELLOW
GLUCOSE UR STRIP-MCNC: NEGATIVE MG/DL
HGB UR QL STRIP: NEGATIVE
KETONES UR STRIP-MCNC: NEGATIVE MG/DL
LEUKOCYTE ESTERASE UR QL STRIP: NEGATIVE
NITRITE UR QL STRIP: NEGATIVE
PH UR STRIP: 6.5 [PH]
PROT UR STRIP-MCNC: NEGATIVE MG/DL
SP GR UR STRIP: 1.01
UROBILINOGEN UR STRIP-ACNC: <2 MG/DL

## 2025-06-29 NOTE — ED INITIAL ASSESSMENT (HPI)
Intermittent RUQ abd pain x 10 days.  Denies any abd surgeries.  Has lost 25 lbs in past year.  Denies N/V/D or bloating.  Denies UTI s/s.  Last BM - today normal & is regular.

## 2025-07-01 ENCOUNTER — HOSPITAL ENCOUNTER (EMERGENCY)
Facility: HOSPITAL | Age: 35
Discharge: HOME | End: 2025-07-01
Attending: EMERGENCY MEDICINE
Payer: COMMERCIAL

## 2025-07-01 ENCOUNTER — APPOINTMENT (OUTPATIENT)
Dept: CT IMAGING | Facility: HOSPITAL | Age: 35
End: 2025-07-01
Attending: EMERGENCY MEDICINE
Payer: COMMERCIAL

## 2025-07-01 VITALS
DIASTOLIC BLOOD PRESSURE: 76 MMHG | SYSTOLIC BLOOD PRESSURE: 124 MMHG | TEMPERATURE: 98 F | WEIGHT: 152 LBS | RESPIRATION RATE: 16 BRPM | OXYGEN SATURATION: 100 % | BODY MASS INDEX: 26 KG/M2 | HEART RATE: 81 BPM

## 2025-07-01 DIAGNOSIS — R10.9 ABDOMINAL PAIN OF UNKNOWN ETIOLOGY: Primary | ICD-10-CM

## 2025-07-01 LAB
ALBUMIN SERPL-MCNC: 5 G/DL (ref 3.2–4.8)
ALBUMIN/GLOB SERPL: 1.9 {RATIO} (ref 1–2)
ALP LIVER SERPL-CCNC: 53 U/L (ref 37–98)
ALT SERPL-CCNC: 21 U/L (ref 10–49)
ANION GAP SERPL CALC-SCNC: 9 MMOL/L (ref 0–18)
AST SERPL-CCNC: 16 U/L (ref ?–34)
B-HCG UR QL: NEGATIVE
BASOPHILS # BLD AUTO: 0.05 X10(3) UL (ref 0–0.2)
BASOPHILS NFR BLD AUTO: 0.6 %
BILIRUB SERPL-MCNC: 0.4 MG/DL (ref 0.3–1.2)
BILIRUB UR QL STRIP.AUTO: NEGATIVE
BUN BLD-MCNC: 21 MG/DL (ref 9–23)
CALCIUM BLD-MCNC: 10 MG/DL (ref 8.7–10.6)
CHLORIDE SERPL-SCNC: 108 MMOL/L (ref 98–112)
CLARITY UR REFRACT.AUTO: CLEAR
CO2 SERPL-SCNC: 26 MMOL/L (ref 21–32)
CREAT BLD-MCNC: 0.97 MG/DL (ref 0.55–1.02)
EGFRCR SERPLBLD CKD-EPI 2021: 77 ML/MIN/1.73M2 (ref 60–?)
EOSINOPHIL # BLD AUTO: 0.11 X10(3) UL (ref 0–0.7)
EOSINOPHIL NFR BLD AUTO: 1.3 %
ERYTHROCYTE [DISTWIDTH] IN BLOOD BY AUTOMATED COUNT: 12.6 %
GLOBULIN PLAS-MCNC: 2.7 G/DL (ref 2–3.5)
GLUCOSE BLD-MCNC: 100 MG/DL (ref 70–99)
GLUCOSE UR STRIP.AUTO-MCNC: NORMAL MG/DL
HCT VFR BLD AUTO: 42.5 % (ref 35–48)
HGB BLD-MCNC: 14.2 G/DL (ref 12–16)
IMM GRANULOCYTES # BLD AUTO: 0.01 X10(3) UL (ref 0–1)
IMM GRANULOCYTES NFR BLD: 0.1 %
KETONES UR STRIP.AUTO-MCNC: NEGATIVE MG/DL
LEUKOCYTE ESTERASE UR QL STRIP.AUTO: NEGATIVE
LIPASE SERPL-CCNC: 110 U/L (ref 12–53)
LYMPHOCYTES # BLD AUTO: 2.32 X10(3) UL (ref 1–4)
LYMPHOCYTES NFR BLD AUTO: 28.4 %
MCH RBC QN AUTO: 29.5 PG (ref 26–34)
MCHC RBC AUTO-ENTMCNC: 33.4 G/DL (ref 31–37)
MCV RBC AUTO: 88.2 FL (ref 80–100)
MONOCYTES # BLD AUTO: 0.71 X10(3) UL (ref 0.1–1)
MONOCYTES NFR BLD AUTO: 8.7 %
NEUTROPHILS # BLD AUTO: 4.97 X10 (3) UL (ref 1.5–7.7)
NEUTROPHILS # BLD AUTO: 4.97 X10(3) UL (ref 1.5–7.7)
NEUTROPHILS NFR BLD AUTO: 60.9 %
NITRITE UR QL STRIP.AUTO: NEGATIVE
OSMOLALITY SERPL CALC.SUM OF ELEC: 299 MOSM/KG (ref 275–295)
PH UR STRIP.AUTO: 5.5 [PH] (ref 5–8)
PLATELET # BLD AUTO: 265 10(3)UL (ref 150–450)
POTASSIUM SERPL-SCNC: 3.7 MMOL/L (ref 3.5–5.1)
PROT SERPL-MCNC: 7.7 G/DL (ref 5.7–8.2)
PROT UR STRIP.AUTO-MCNC: NEGATIVE MG/DL
RBC # BLD AUTO: 4.82 X10(6)UL (ref 3.8–5.3)
RBC UR QL AUTO: NEGATIVE
SODIUM SERPL-SCNC: 143 MMOL/L (ref 136–145)
SP GR UR STRIP.AUTO: 1.03 (ref 1–1.03)
UROBILINOGEN UR STRIP.AUTO-MCNC: NORMAL MG/DL
WBC # BLD AUTO: 8.2 X10(3) UL (ref 4–11)

## 2025-07-01 PROCEDURE — 99284 EMERGENCY DEPT VISIT MOD MDM: CPT

## 2025-07-01 PROCEDURE — 83690 ASSAY OF LIPASE: CPT | Performed by: EMERGENCY MEDICINE

## 2025-07-01 PROCEDURE — 99285 EMERGENCY DEPT VISIT HI MDM: CPT

## 2025-07-01 PROCEDURE — 80053 COMPREHEN METABOLIC PANEL: CPT | Performed by: EMERGENCY MEDICINE

## 2025-07-01 PROCEDURE — 74177 CT ABD & PELVIS W/CONTRAST: CPT | Performed by: EMERGENCY MEDICINE

## 2025-07-01 PROCEDURE — 36415 COLL VENOUS BLD VENIPUNCTURE: CPT

## 2025-07-01 PROCEDURE — 85025 COMPLETE CBC W/AUTO DIFF WBC: CPT | Performed by: EMERGENCY MEDICINE

## 2025-07-01 PROCEDURE — 81003 URINALYSIS AUTO W/O SCOPE: CPT | Performed by: EMERGENCY MEDICINE

## 2025-07-01 PROCEDURE — 83690 ASSAY OF LIPASE: CPT

## 2025-07-01 PROCEDURE — 85025 COMPLETE CBC W/AUTO DIFF WBC: CPT

## 2025-07-01 PROCEDURE — 81025 URINE PREGNANCY TEST: CPT

## 2025-07-01 PROCEDURE — 80053 COMPREHEN METABOLIC PANEL: CPT

## 2025-07-01 NOTE — ED PROVIDER NOTES
History     Chief Complaint   Patient presents with    Abdominal Pain     Entered by patient       Subjective:   YASSINE Lama EDILMA Neves, 35 year old female with notable medical history of bicuspid aortic valve, HLD,  who presents with abdominal pain. Reports has had an intermittent sharp pain between the RUQ and RLQ for 1 week. Denies any fevers, chills, nausea, emesis, diarrhea, constipation, dysuria, urinary urgency/frequency, vaginal discharge. Denies any unintentional weight loss. There are no alleviating or aggravating factors. It is not associated with food. Denies hx of kidney stones.     Problem List[1]   Objective:   Past Medical History:    Allergic rhinitis    Anxiety    Bicuspid aortic valve    Hyperlipidemia    Nonrheumatic aortic (valve) insufficiency              History reviewed. No pertinent surgical history.             Social History     Socioeconomic History    Marital status: Single   Tobacco Use    Smoking status: Never     Passive exposure: Never    Smokeless tobacco: Never   Vaping Use    Vaping status: Never Used   Substance and Sexual Activity    Alcohol use: Yes     Alcohol/week: 3.0 standard drinks of alcohol     Types: 1 Glasses of wine, 2 Standard drinks or equivalent per week     Comment: socially    Drug use: No   Other Topics Concern    Caffeine Concern No    Exercise Yes    Seat Belt Yes    Special Diet No    Stress Concern No    Weight Concern No   Social History Narrative    Single     No kids    Nonsmoker    Occasional EtOH    No drugs                  Medications Ordered Prior to Encounter[2]      Constitutional and vital signs reviewed.      All other systems reviewed and negative except as noted above.    I have reviewed the family history, social history, allergies, and outpatient medications.     History reviewed from EMR: Encounters, problem list, allergies, medications      Physical Exam     ED Triage Vitals [06/29/25 1352]   /85   Pulse 100   Resp  18   Temp 98.3 °F (36.8 °C)   Temp src Oral   SpO2 98 %   O2 Device None (Room air)       Current:/85   Pulse 100   Temp 98.3 °F (36.8 °C) (Oral)   Resp 18   LMP  (LMP Unknown)   SpO2 98%       Physical Exam  Vitals and nursing note reviewed. Exam conducted with a chaperone present (DEONTE Rodriguez).   Constitutional:       General: She is not in acute distress.     Appearance: Normal appearance. She is not ill-appearing or toxic-appearing.   HENT:      Head: Normocephalic and atraumatic.      Right Ear: External ear normal.      Left Ear: External ear normal.      Nose: Nose normal.   Eyes:      General:         Right eye: No discharge.         Left eye: No discharge.      Extraocular Movements: Extraocular movements intact.      Conjunctiva/sclera: Conjunctivae normal.      Pupils: Pupils are equal, round, and reactive to light.   Cardiovascular:      Rate and Rhythm: Normal rate and regular rhythm.      Pulses: Normal pulses.      Heart sounds: Normal heart sounds.   Pulmonary:      Effort: Pulmonary effort is normal.      Breath sounds: Normal breath sounds.   Abdominal:      General: Abdomen is flat. Bowel sounds are normal. There is no abdominal bruit. There are no signs of injury.      Palpations: Abdomen is soft. There is no hepatomegaly or mass.      Tenderness: There is no abdominal tenderness. There is no right CVA tenderness, left CVA tenderness, guarding or rebound. Negative signs include Duff's sign, Rovsing's sign and McBurney's sign.      Hernia: No hernia is present.   Genitourinary:     Pubic Area: No rash.       Labia:         Right: No rash or tenderness.         Left: No rash or tenderness.       Vagina: Normal.      Cervix: Normal.      Uterus: Not tender.       Adnexa:         Right: No mass or tenderness.          Left: No mass or tenderness.     Musculoskeletal:      Cervical back: Normal range of motion and neck supple.   Skin:     General: Skin is warm and dry.      Coloration: Skin  is not jaundiced or pale.   Neurological:      General: No focal deficit present.      Mental Status: She is alert and oriented to person, place, and time.   Psychiatric:         Mood and Affect: Mood normal.         Behavior: Behavior normal.            ED Course     Labs Reviewed   POCT PREGNANCY URINE - Normal   [facility] POCT URINALYSIS DIPSTICK     No orders to display       Vitals:    06/29/25 1352   BP: 139/85   Pulse: 100   Resp: 18   Temp: 98.3 °F (36.8 °C)   TempSrc: Oral   SpO2: 98%            [facility]        Daina Neves, 35 year old female with medical history as noted above who presents with abdominal pain    -VSS. NAD. Afebrile  -negative Duff and McBurney point. Normal UA without signs of infection or hematuria. Abdominal exam benign without any acute findings. Pelvic exam chaperoned by DEONTE Rodriguez. On exam no acute findings. There is no adnexal tenderness or uterine tenderness.  -DDX considered but not limited to gastroenteritis, functional abdominal pain, nephrolithiasis, appendicitis, cholecystitis, UTI, ovarian/ pathology vs other  -discussed suppostive care with hydration, dietary modification, OTC analgesia. The patient is encouraged to return if any concerning symptoms arise. Additional verbal discharge instructions are given and discussed. Discharge medications are discussed. The patient is in good condition throughout the visit today and remains so upon discharge. I discuss the plan of care with the patient, who expresses understanding. All questions and concerns are addressed to the patient's satisfaction prior to discharge today. ED precautions discussed.        ** See ED course below for additional information on care provided / interventions / notable events throughout patient's encounter.           ** I have independently reviewed the radiology images, clinical lab results, and ECG tracings as described above (if applicable)    ** Concerning co-morbidities possibly affecting complaint / care:  none        Medical Decision Making  Amount and/or Complexity of Data Reviewed  Labs: ordered. Decision-making details documented in ED Course.    Risk  OTC drugs.        Disposition and Plan     Disposition:  Discharge  6/29/2025  2:37 pm    Clinical Impression:  1. Abdominal pain, unspecified abdominal location         Follow-up:  Krystle Ramires DO  [address] [place] [facility] [address]  [phone]                Medications Prescribed:  Discharge Medication List as of 6/29/2025  2:42 PM            ALVARO Espinoza, APRN, FNP-BC  Family Nurse Practitioner  [facility]      The above patient (and/or guardian) was made aware that an appropriate evaluation has been performed, and that no additional testing is required at this time. In my medical judgment, there is currently no evidence of an immediate life-threatening or surgical condition, therefore discharge is indicated at this time. The patient (and/or guardian) was advised that a small risk still exists that a serious condition could develop. The patient was instructed to arrange close follow-up with their primary care provider (or the referral provider given today). The patient received written and verbal instructions regarding their condition / concerns, demonstrated understanding, and is agreement with the outpatient treatment plan.            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     Current Outpatient Medications on File Prior to Encounter   Medication Sig Dispense Refill    cyanocobalamin 500 MCG Oral Tab Take 1 tablet (500 mcg total) by mouth daily.      valACYclovir 50 mg/mL Oral Suspension  Take 20 mL (1,000 mg total) by mouth every 8 (eight) hours.      Multiple Vitamin (MULTI-DAY VITAMINS) Oral Tab Take by mouth.      calcium carb-cholecalciferol 500-10 MG-MCG Oral Chew Tab Chew 1 tablet by mouth daily.      cholecalciferol 50 MCG (2000 UT) Oral Cap Take by mouth.      MedroxyPROGESTERone Acetate (DEPO-PROVERA) 150 MG/ML Intramuscular Suspension Last given 12/12/2023

## 2025-07-01 NOTE — ED INITIAL ASSESSMENT (HPI)
Pt c/o low abd pain/RLQ pain for 1.5wks. pt went to UC on Sunday and had pelvic exam was told to follow up with an US. Pain is more cramping and pinching that is intermittent. No c/o n/v/d. No c/o irregular bleeding.

## 2025-07-01 NOTE — ED QUICK NOTES
Rounding Completed    Plan of Care reviewed. Waiting for results.      Bed is locked and in lowest position. Call light within reach.

## 2025-07-01 NOTE — ED PROVIDER NOTES
Patient Seen in: [facility] Emergency Department        History  Chief Complaint   Patient presents with    Abdominal Pain     Abd pain for 1+ week, seen at urgent care.  Traveling tomorrow.    Clint     Stated Complaint: abd pain    Subjective:   HPI          35-year-old female who presents to the emergency department complaint of having abdominal pain for 1-1/2 weeks.  The patient says the pain is intermittent pinching lower abdominal pain.  Initially the pain started in the epigastrium is now in the lower quadrant.  She denies fevers or chills.  No nausea or vomiting.  Pain seems to worsen when she is sitting.  Reviewing her records she was seen on 6/29/2025 for the abdominal pain and had a workup at the immediate care but had no imaging performed.  The patient was concerned because of the continued abdominal pain and wanted an evaluation here in the ER because she is traveling tomorrow.        Objective:     Past Medical History:    Allergic rhinitis    Anxiety    Bicuspid aortic valve    Hyperlipidemia    Nonrheumatic aortic (valve) insufficiency              History reviewed. No pertinent surgical history.             Social History     Socioeconomic History    Marital status: Single   Tobacco Use    Smoking status: Never     Passive exposure: Never    Smokeless tobacco: Never   Vaping Use    Vaping status: Never Used   Substance and Sexual Activity    Alcohol use: Yes     Alcohol/week: 3.0 standard drinks of alcohol     Types: 1 Glasses of wine, 2 Standard drinks or equivalent per week     Comment: socially    Drug use: No   Other Topics Concern    Caffeine Concern No    Exercise Yes    Seat Belt Yes    Special Diet No    Stress Concern No    Weight Concern No   Social History Narrative    Single     No kids    Nonsmoker    Occasional EtOH    No drugs                                    Physical Exam    ED Triage Vitals [07/01/25 0802]   /83   Pulse 100   Resp 16   Temp 97.8 °F  (36.6 °C)   Temp src    SpO2 99 %   O2 Device        Current Vitals:   Vital Signs  BP: 124/76  Pulse: 81  Resp: 16  Temp: 97.8 °F (36.6 °C)  MAP (mmHg): 89    Oxygen Therapy  SpO2: 100 %            Physical Exam  General: This a pleasant nontoxic appearing patient in no apparent distress alert and oriented ×3  HEENT: Pupils are equal reactive to light.  Extra ocular motions are intact.  No scleral icterus or conjunctival pallor.  Head is atraumatic normocephalic.  Oral mucosa moist.  Tongue is midline.    Lungs: Clear to auscultation bilaterally.  No wheezes, rhonchi, or rales appreciated.  No accessory muscle use noted for breathing.  Cardiac: Regular rate and rhythm.  Normal S1 and 2 without murmurs or ectopy appreciated  Abdomen: Soft on examination without tenderness to deep palpation or to percussion.  No masses appreciated.  Bowel sounds are normoactive.  No CVA tenderness.  Extremities: Moving all 4 without difficulty.  No obvious deformities.      ED Course  Labs Reviewed   COMP METABOLIC PANEL (14) - Abnormal; Notable for the following components:       Result Value    Glucose 100 (*)     Calculated Osmolality 299 (*)     Albumin 5.0 (*)     All other components within normal limits   LIPASE - Abnormal; Notable for the following components:    Lipase 110 (*)     All other components within normal limits   POCT PREGNANCY URINE - Normal   CBC WITH DIFFERENTIAL WITH PLATELET   URINALYSIS, ROUTINE   Narrative    PROCEDURE: CT APPENDIX ABD/PEL W CONTRAST (CPT=74177)    INDICATIONS: Right lower quadrant abdominal pain.    PATIENT STATED HISTORY: Patient is here for RLQ abdominal pain.    COMPARISON: None    TECHNIQUE: Multi-slice CT images of the chest, abdomen, and pelvis were performed with intravenous contrast material. Automated exposure control techniques for dose reduction were used, adjustment of the mA and/or kV were based on patient's size. Use of  iterative reconstruction technique for dose reduction  was used. Dose information is transmitted to the ACR (American College of Radiology) NRDR (National Radiology Data Registry) which includes the Dose Index Registry.    CONTRAST: IOPAMIDOL 76% IV SOLN FOR POWER INJECTOR:100 mL :      FINDINGS:      LIVER: No enlargement, atrophy, abnormal density, or significant focal lesion. Incidental simple nonenhancing cyst within the lateral segment left lobe measuring 1.8 cm. This is consistent with a benign cyst and no further work-up is required are  recommended.    BILIARY: No visible dilatation or calcification.    PANCREAS: No lesion, fluid collection, ductal dilatation, or atrophy.    SPLEEN: No enlargement or focal lesion.    KIDNEYS: Normal. No mass, obstruction, or calcification.    ADRENALS: No mass or enlargement.    AORTA/VASCULAR: Normal. No aneurysm or dissection.    RETROPERITONEUM: No mass or enlarged adenopathy.    BOWEL/MESENTERY: Normal. No visible mass, obstruction, or bowel wall thickening. The appendix is within normal limits. No free intraperitoneal air, fluid or inflammatory changes of the peritoneal cavity identified.    ABDOMINAL WALL: Normal. No mass or hernia.    URINARY BLADDER: Normal. No visible focal wall thickening, lesion, or calculus.    PELVIC NODES: Normal. No adenopathy.    PELVIC ORGANS: Normal. No visible mass. Pelvic organs appropriate for patient age.    BONES: Normal. No bony lesion or fracture.    OTHER: Negative.       Impression  CONCLUSION:  No acute process. Normal appearance of the appendix.                        Electronically Verified and Signed by Attending Radiologist: Ricardo Garcia MD 7/1/2025 9:36 AM                         MDM   Differential diagnosis includes but is not limited to acute appendicitis, bowel obstruction, acute colitis, bowel perforation, pancreatitis, kidney stones.  Urine pregnancy was negative.  Glucose of 100.  Albumin of 5.0.  The rest of the metabolic panel was normal including all transaminase levels.   Urinalysis no signs of infection.  CBC was normal.  Lipase was 110.  CAT scan undertaken to assess for acute intra-abdominal pathology may account for her symptoms.  CAT scan was performed  I reviewed the x-rays and agree with the radiologist report that showed no acute process normal.  To the appendix.  Pancreas has no lesions, fluid collections, ductal dilation, or atrophy.  Liver has an incidental simple nonenhancing cyst within the lateral segment of the left lobe measuring 1.8 cm.  Consistent with a benign cyst and no further workup is recommended or required.  The patient does not appear to have an acute intra-abdominal pathologic process to account for the symptoms.  Nonspecific etiology of abdominal pain.  The elevated lipase at this time does not correlate to a clinical finding of acute pancreatitis.  Will need follow-up as an outpatient.  Note to Patient  The 21st Century Cures Act makes medical notes like these available to patients in the interest of transparency. However, be advised this is a medical document and is intended as [id number] communication; it is written in medical language and may appear blunt, direct, or contain abbreviations or verbiage that are unfamiliar. Medical documents are intended to carry relevant information, facts as evident, and the clinical opinion of the practitioner.  Patient was evaluated and a screening exam was performed.   As a treating physician attending to the patient, I determined, within reasonable clinical confidence and prior to discharge, that an emergency medical condition was not or was no longer present.  There was no indication for further evaluation, treatment or admission on an emergency basis.  Comprehensive verbal and written discharge and follow-up instructions were provided to help prevent relapse or worsening.  Patient was instructed to follow-up with their primary care provider for further evaluation and treatment, but to return immediately to the  ER for worsening, concerning, new, changing or persisting symptoms.  I discussed the case with the patient and they had no questions, complaints, or concerns.  Patient felt comfortable going home.  ^^Please note that this report has been produced using speech recognition software and may contain errors related to that system including, but not limited to, errors in grammar, punctuation, and spelling, as well as words and phrases that possibly may have been recognized inappropriately.  If there are any questions or concerns, contact the dictating provider for clarification.  Medical Decision Making      Disposition and Plan     Clinical Impression:  1. Abdominal pain of unknown etiology         Disposition:  Discharge  7/1/2025 10:00 am    Follow-up:  Krystle Ramires DO  [address] [facility] [id number] [phone]    Schedule an appointment as soon as possible for a visit in 1 week(s)            Medications Prescribed:  Current Discharge Medication List                Supplementary Documentation:

## 2025-07-11 ENCOUNTER — OFFICE VISIT (OUTPATIENT)
Dept: FAMILY MEDICINE CLINIC | Facility: CLINIC | Age: 35
End: 2025-07-11
Payer: COMMERCIAL

## 2025-07-11 VITALS
RESPIRATION RATE: 19 BRPM | HEART RATE: 95 BPM | SYSTOLIC BLOOD PRESSURE: 126 MMHG | OXYGEN SATURATION: 95 % | WEIGHT: 155.63 LBS | DIASTOLIC BLOOD PRESSURE: 78 MMHG | BODY MASS INDEX: 26.57 KG/M2 | TEMPERATURE: 97 F | HEIGHT: 64 IN

## 2025-07-11 DIAGNOSIS — R10.31 RIGHT LOWER QUADRANT ABDOMINAL PAIN: Primary | ICD-10-CM

## 2025-07-11 DIAGNOSIS — R25.3 EYELID TWITCH: ICD-10-CM

## 2025-07-11 PROCEDURE — 3008F BODY MASS INDEX DOCD: CPT | Performed by: FAMILY MEDICINE

## 2025-07-11 PROCEDURE — G2211 COMPLEX E/M VISIT ADD ON: HCPCS | Performed by: FAMILY MEDICINE

## 2025-07-11 PROCEDURE — 99213 OFFICE O/P EST LOW 20 MIN: CPT | Performed by: FAMILY MEDICINE

## 2025-07-11 PROCEDURE — 3074F SYST BP LT 130 MM HG: CPT | Performed by: FAMILY MEDICINE

## 2025-07-11 PROCEDURE — 3078F DIAST BP <80 MM HG: CPT | Performed by: FAMILY MEDICINE

## 2025-07-11 NOTE — PROGRESS NOTES
The following individual(s) verbally consented to be recorded using ambient AI listening technology and understand that they can each withdraw their consent to this listening technology at any point by asking the clinician to turn off or pause the recording:    Patient name: Daina FRANCE Pura  Additional names:  none

## 2025-07-11 NOTE — PROGRESS NOTES
Subjective:   Daina Neves is a 35 year old female who presents for Follow - Up ([facility] Emergency Department DOS 07/1/2025//)       History/Other:   History of Present Illness      35-year-old female here for follow-up for abdominal pain evaluated in the ER on 7/1/2025 that started about 3 weeks ago in June 2025-pain started insidiously in the right upper quadrant/ epigastric area but now moved to the lower right quadrant /pelvic area worse with sitting.  She was also seen on 6/29/2025 in the immediate care for abdominal pain but no imaging was performed.  Her appendix was well-visualized on the CT.  Patient presented to the ER because she was traveling the following day and wanted to be reevaluated.  CT of the abdomen revealed no acute finding only liver had an incidental simple cyst that was 1.8 cm which is typically benign and no further workup is indicated.  UA and urine hCG were negative, lipase, CBC, CMP all normal. She describes the patient as a pinching feeling and it occurs about 5 times a day mostly with sitting or leaning forward or elevating her legs while she is sitting.  The pain only lasts a few seconds and does not persist.  Patient states its gotten better since her ER visit she felt relieved she did not have appendicitis.      She has been experiencing abdominal discomfort described as a 'pinching' or 'cramping' sensation for the past two weeks. The discomfort is intermittent and primarily located in the right lower quadrant, occasionally extending to the side. It is most noticeable when sitting or bending forward.    She sought care at urgent care and the ER, where she underwent a CT scan; she was told by the ER doctor that everything looked fine except for a cyst on her liver, which was not concerning. She has been on Depo-Provera injections for contraception and has not had a menstrual period in nine years. No changes in bowel habits, diarrhea, constipation, or urinary symptoms.  She  has no family history of colon cancer, IBD, pancreatic cancer.  She has never had a colonoscopy.  She is worried about an ovarian cyst.    She maintains a regular exercise routine and has recently lost 27 pounds through a calorie deficit diet. The abdominal discomfort has not significantly impacted her daily activities, including exercise. No fever, chills, or systemic symptoms. Recent travel and associated stress are noted and has been having right eye twitching for 3 weeks no vision changes.  .       Chief Complaint Reviewed and Verified  Nursing Notes Reviewed and   Verified  Tobacco Reviewed  Allergies Reviewed  Medications Reviewed    OB Status Reviewed         Tobacco:  She has never smoked tobacco.    Current Medications[1]    PHQ-2 SCORE: 0  , done 7/11/2025   Last [place] Suicide Screening on 7/11/2025 was No Risk.       Review of Systems:  Pertinent items are noted in HPI.      Objective:   /78   Pulse 95   Temp 97.3 °F (36.3 °C) (Temporal)   Resp 19   Ht 5' 4\" (1.626 m)   Wt 155 lb 9.6 oz (70.6 kg)   LMP  (LMP Unknown)   SpO2 95%   BMI 26.71 kg/m²  Estimated body mass index is 26.71 kg/m² as calculated from the following:    Height as of this encounter: 5' 4\" (1.626 m).    Weight as of this encounter: 155 lb 9.6 oz (70.6 kg).  Results  RADIOLOGY  CT scan abdomen (07/01/2025): Hepatic cyst, pelvic organs appropriate for age, no visible mass, appendix normal, kidneys unremarkable, pancreas unremarkable, bladder unremarkable.   CT APPENDIX ABD/PEL W CONTRAST (CPT=74177)  Result Date: 7/1/2025  PROCEDURE: CT APPENDIX ABD/PEL W CONTRAST (CPT=74177) INDICATIONS: Right lower quadrant abdominal pain. PATIENT STATED HISTORY: Patient is here for RLQ abdominal pain. COMPARISON: None TECHNIQUE: Multi-slice CT images of the chest, abdomen, and pelvis were performed with intravenous contrast material. Automated exposure control techniques for dose reduction were used, adjustment of the mA and/or kV  were based on patient's size. Use of iterative reconstruction technique for dose reduction was used. Dose information is transmitted to the ACR (American College of Radiology) NRDR (National Radiology Data Registry) which includes the Dose Index Registry. CONTRAST: IOPAMIDOL 76% IV SOLN FOR POWER INJECTOR:100 mL : FINDINGS: LIVER: No enlargement, atrophy, abnormal density, or significant focal lesion. Incidental simple nonenhancing cyst within the lateral segment left lobe measuring 1.8 cm. This is consistent with a benign cyst and no further work-up is required are recommended. BILIARY: No visible dilatation or calcification. PANCREAS: No lesion, fluid collection, ductal dilatation, or atrophy. SPLEEN: No enlargement or focal lesion. KIDNEYS: Normal. No mass, obstruction, or calcification. ADRENALS: No mass or enlargement. AORTA/VASCULAR: Normal. No aneurysm or dissection. RETROPERITONEUM: No mass or enlarged adenopathy. BOWEL/MESENTERY: Normal. No visible mass, obstruction, or bowel wall thickening. The appendix is within normal limits. No free intraperitoneal air, fluid or inflammatory changes of the peritoneal cavity identified. ABDOMINAL WALL: Normal. No mass or hernia. URINARY BLADDER: Normal. No visible focal wall thickening, lesion, or calculus. PELVIC NODES: Normal. No adenopathy. PELVIC ORGANS: Normal. No visible mass. Pelvic organs appropriate for patient age. BONES: Normal. No bony lesion or fracture. OTHER: Negative.     CONCLUSION: No acute process. Normal appearance of the appendix. Electronically Verified and Signed by Attending Radiologist: Ricardo Garcia MD 7/1/2025 9:36 AM Workstation: [id number]      Physical Exam    General: Well-nourished, well hydrated. No acute distress. No pallor. No tachypnea. Non toxic.  HEENT: normocephaly, atraumatic.  Sclera clear and non icteric bilaterally.  Oral pharynx clear without normal finding.  Moist mucous membranes.  Neck: supple. No adenopathy. No thyromegaly.    Heart: RRR without S3 or S4 murmur . Clear S1S2  Lungs: clear to auscultation bilaterally. No rales, rhonchi or wheezes. Good inspiratory and expiratory effort  Abdomen: soft, nontender, nondistended. NL bowel sounds.   Extremities: No cyanosis, clubbing, or edema bilaterally.   Skin: no acute rashes  Neuro: AOx3.  Normal gait       Assessment & Plan:   1. Right lower quadrant abdominal pain (Primary)  -     Gastro Referral - In Network  -     [facility] US PELVIS (TRANSABDOMINAL AND TRANSVAGINAL) (CPT=76856/83408) [[id number]]; Future; Expected date: 07/11/2025  -     [facility] US PELVIS (TRANSABDOMINAL AND TRANSVAGINAL) (CPT=76856/08517) [[id number]]  2. Eyelid twitch  Comments:  Right  Eyelid twitching should resolve on own this is a benign phenomenon often associated with stress.  Patient reported sleeping 7 to 8 hours a night.  Assessment & Plan  Abdominal Pain  Intermittent right lower quadrant abdominal discomfort described as pinching or cramping for two weeks. CT scan from ER visit showed no acute findings, with a liver cyst deemed non-contributory. Differential includes ovarian cysts, IBS, though Depo-Provera use typically inhibits ovulation and cyst formation. No gastrointestinal symptoms such as diarrhea or constipation. No family history of colon cancer. Symptoms are lessening and not significantly impacting daily activities. If ultrasound is normal and symptoms persist, GI referral may be considered for potential colonoscopy, although the likelihood of colon cancer is low given the absence of alarming symptoms and family history is less likely.  Patient has lost significant weight 27 pounds reported in the past several months through calorie deficit.  Is a possible there could be a ligament in the abdomen that becomes slightly depressed or irritated when she is contacting her lower body but it is not clinically significant.  Her urine analysis is negative for blood.  Her father did have bladder cancer.  We  discussed given the nature of the pain this is most likely benign.  - Order pelvic ultrasound to evaluate uterus and ovaries  - If ultrasound is normal and symptoms persist, refer to GI for further evaluation, possibly considering colonoscopy    General Health Maintenance  Significant weight loss of 27 pounds achieved through calorie deficit and regular exercise, exceeding initial goal of 10 pounds.  - Encourage continuation of healthy lifestyle and weight management        Return in about 1 month (around 8/11/2025), or if symptoms worsen,fail to improve sooner if worse.Schedule consult gastroenerology to evaluate.        Krystle Ramires DO, 7/11/2025, 9:20 AM             [1]   Current Outpatient Medications   Medication Sig Dispense Refill    cyanocobalamin 500 MCG Oral Tab Take 1 tablet (500 mcg total) by mouth daily.      valACYclovir 50 mg/mL Oral Suspension Take 20 mL (1,000 mg total) by mouth every 8 (eight) hours.      Multiple Vitamin (MULTI-DAY VITAMINS) Oral Tab Take by mouth.      calcium carb-cholecalciferol 500-10 MG-MCG Oral Chew Tab Chew 1 tablet by mouth daily.      cholecalciferol 50 MCG (2000 UT) Oral Cap Take by mouth.      MedroxyPROGESTERone Acetate (DEPO-PROVERA) 150 MG/ML Intramuscular Suspension Last given 12/12/2023

## 2025-07-11 NOTE — PATIENT INSTRUCTIONS
CT APPENDIX ABD/PEL W CONTRAST (CPT=74177)  Result Date: 7/1/2025  PROCEDURE: CT APPENDIX ABD/PEL W CONTRAST (CPT=74177) INDICATIONS: Right lower quadrant abdominal pain. PATIENT STATED HISTORY: Patient is here for RLQ abdominal pain. COMPARISON: None TECHNIQUE: Multi-slice CT images of the chest, abdomen, and pelvis were performed with intravenous contrast material. Automated exposure control techniques for dose reduction were used, adjustment of the mA and/or kV were based on patient's size. Use of iterative reconstruction technique for dose reduction was used. Dose information is transmitted to the ACR (American College of Radiology) NRDR (National Radiology Data Registry) which includes the Dose Index Registry. CONTRAST: IOPAMIDOL 76% IV SOLN FOR POWER INJECTOR:100 mL : FINDINGS: LIVER: No enlargement, atrophy, abnormal density, or significant focal lesion. Incidental simple nonenhancing cyst within the lateral segment left lobe measuring 1.8 cm. This is consistent with a benign cyst and no further work-up is required are recommended. BILIARY: No visible dilatation or calcification. PANCREAS: No lesion, fluid collection, ductal dilatation, or atrophy. SPLEEN: No enlargement or focal lesion. KIDNEYS: Normal. No mass, obstruction, or calcification. ADRENALS: No mass or enlargement. AORTA/VASCULAR: Normal. No aneurysm or dissection. RETROPERITONEUM: No mass or enlarged adenopathy. BOWEL/MESENTERY: Normal. No visible mass, obstruction, or bowel wall thickening. The appendix is within normal limits. No free intraperitoneal air, fluid or inflammatory changes of the peritoneal cavity identified. ABDOMINAL WALL: Normal. No mass or hernia. URINARY BLADDER: Normal. No visible focal wall thickening, lesion, or calculus. PELVIC NODES: Normal. No adenopathy. PELVIC ORGANS: Normal. No visible mass. Pelvic organs appropriate for patient age. BONES: Normal. No bony lesion or fracture. OTHER: Negative.     CONCLUSION: No acute  process. Normal appearance of the appendix. Electronically Verified and Signed by Attending Radiologist: Ricardo Garcia MD 7/1/2025 9:36 AM Workstation: [id number]

## (undated) NOTE — Clinical Note
Hello,   I think the disc protrusion / herniation at T11/12 is incidental but advised to monitor for new symptoms to suggest cord compression or new neuropathy symptoms/ balance issues, bowel /bladder changes, etc; overall, not related to where her symptoms are and maybe this was due to low B12 in conjunction with localized compression - told her to stay on B12 supplement since should not cause any problems and I can see her in 6 months or sooner if needed.  Thanks Hernesto Dobbs MD, Neurology [facility] Pager [phone] 4/11/2024